# Patient Record
Sex: FEMALE | Race: BLACK OR AFRICAN AMERICAN | NOT HISPANIC OR LATINO | Employment: FULL TIME | URBAN - METROPOLITAN AREA
[De-identification: names, ages, dates, MRNs, and addresses within clinical notes are randomized per-mention and may not be internally consistent; named-entity substitution may affect disease eponyms.]

---

## 2017-04-12 ENCOUNTER — ALLSCRIPTS OFFICE VISIT (OUTPATIENT)
Dept: OTHER | Facility: OTHER | Age: 42
End: 2017-04-12

## 2017-04-17 RX ORDER — SODIUM CHLORIDE 9 MG/ML
20 INJECTION, SOLUTION INTRAVENOUS ONCE
Status: COMPLETED | OUTPATIENT
Start: 2017-04-18 | End: 2017-04-18

## 2017-04-18 ENCOUNTER — HOSPITAL ENCOUNTER (OUTPATIENT)
Dept: INFUSION CENTER | Facility: HOSPITAL | Age: 42
Discharge: HOME | End: 2017-04-18
Payer: COMMERCIAL

## 2017-04-18 VITALS
HEART RATE: 74 BPM | RESPIRATION RATE: 18 BRPM | SYSTOLIC BLOOD PRESSURE: 103 MMHG | DIASTOLIC BLOOD PRESSURE: 54 MMHG | TEMPERATURE: 97.9 F

## 2017-04-18 PROCEDURE — 96365 THER/PROPH/DIAG IV INF INIT: CPT

## 2017-04-18 RX ADMIN — IRON SUCROSE 200 MG: 20 INJECTION, SOLUTION INTRAVENOUS at 14:01

## 2017-04-18 RX ADMIN — SODIUM CHLORIDE 20 ML/HR: 0.9 INJECTION, SOLUTION INTRAVENOUS at 13:50

## 2017-04-18 NOTE — PLAN OF CARE
Problem: Potential for Falls  Goal: Patient will remain free of falls  INTERVENTIONS:  - Assess patient frequently for physical needs  - Identify cognitive and physical deficits and behaviors that affect risk of falls    - [place] fall precautions as indicated by assessment   - Educate patient/family on patient safety including physical limitations  - Instruct patient to call for assistance with activity based on assessment  - Modify environment to reduce risk of injury  - Consider OT/PT consult to assist with strengthening/mobility   Outcome: Progressing

## 2017-04-18 NOTE — PLAN OF CARE
Problem: Potential for Falls  Goal: Patient will remain free of falls  INTERVENTIONS:  - Assess patient frequently for physical needs  - Identify cognitive and physical deficits and behaviors that affect risk of falls    - [place] fall precautions as indicated by assessment   - Educate patient/family on patient safety including physical limitations  - Instruct patient to call for assistance with activity based on assessment  - Modify environment to reduce risk of injury  - Consider OT/PT consult to assist with strengthening/mobility   Outcome: Progressing

## 2017-04-24 RX ORDER — SODIUM CHLORIDE 9 MG/ML
20 INJECTION, SOLUTION INTRAVENOUS ONCE
Status: COMPLETED | OUTPATIENT
Start: 2017-04-25 | End: 2017-04-25

## 2017-04-25 ENCOUNTER — HOSPITAL ENCOUNTER (OUTPATIENT)
Dept: INFUSION CENTER | Facility: HOSPITAL | Age: 42
Discharge: HOME | End: 2017-04-25
Payer: COMMERCIAL

## 2017-04-25 VITALS
DIASTOLIC BLOOD PRESSURE: 64 MMHG | RESPIRATION RATE: 18 BRPM | TEMPERATURE: 97.5 F | SYSTOLIC BLOOD PRESSURE: 116 MMHG | HEART RATE: 68 BPM

## 2017-04-25 PROCEDURE — 96365 THER/PROPH/DIAG IV INF INIT: CPT

## 2017-04-25 RX ADMIN — IRON SUCROSE 200 MG: 20 INJECTION, SOLUTION INTRAVENOUS at 09:28

## 2017-04-25 RX ADMIN — SODIUM CHLORIDE 20 ML/HR: 0.9 INJECTION, SOLUTION INTRAVENOUS at 09:27

## 2017-04-25 NOTE — PLAN OF CARE
Problem: Potential for Falls  Goal: Patient will remain free of falls  INTERVENTIONS:  - Assess patient frequently for physical needs  - Identify cognitive and physical deficits and behaviors that affect risk of falls    - [place] fall precautions as indicated by assessment   - Educate patient/family on patient safety including physical limitations  - Instruct patient to call for assistance with activity based on assessment  - Modify environment to reduce risk of injury  - Consider OT/PT consult to assist with strengthening/mobility   Outcome: Progressing

## 2017-05-01 RX ORDER — SODIUM CHLORIDE 9 MG/ML
20 INJECTION, SOLUTION INTRAVENOUS ONCE
Status: COMPLETED | OUTPATIENT
Start: 2017-05-02 | End: 2017-05-02

## 2017-05-02 ENCOUNTER — HOSPITAL ENCOUNTER (OUTPATIENT)
Dept: INFUSION CENTER | Facility: HOSPITAL | Age: 42
Discharge: HOME | End: 2017-05-02
Payer: COMMERCIAL

## 2017-05-02 VITALS
HEART RATE: 68 BPM | SYSTOLIC BLOOD PRESSURE: 126 MMHG | RESPIRATION RATE: 18 BRPM | DIASTOLIC BLOOD PRESSURE: 59 MMHG | TEMPERATURE: 97 F

## 2017-05-02 PROCEDURE — 96365 THER/PROPH/DIAG IV INF INIT: CPT

## 2017-05-02 RX ADMIN — SODIUM CHLORIDE 20 ML/HR: 0.9 INJECTION, SOLUTION INTRAVENOUS at 08:55

## 2017-05-02 RX ADMIN — IRON SUCROSE 200 MG: 20 INJECTION, SOLUTION INTRAVENOUS at 08:55

## 2017-05-02 NOTE — PLAN OF CARE
Problem: Potential for Falls  Goal: Patient will remain free of falls  INTERVENTIONS:  - Assess patient frequently for physical needs  - Identify cognitive and physical deficits and behaviors that affect risk of falls    - [place] fall precautions as indicated by assessment   - Educate patient/family on patient safety including physical limitations  - Instruct patient to call for assistance with activity based on assessment  - Modify environment to reduce risk of injury  - Consider OT/PT consult to assist with strengthening/mobility   Outcome: Progressing

## 2017-09-08 ENCOUNTER — ALLSCRIPTS OFFICE VISIT (OUTPATIENT)
Dept: OTHER | Facility: OTHER | Age: 42
End: 2017-09-08

## 2017-09-08 DIAGNOSIS — Z12.31 ENCOUNTER FOR SCREENING MAMMOGRAM FOR MALIGNANT NEOPLASM OF BREAST: ICD-10-CM

## 2017-09-08 DIAGNOSIS — B20 HUMAN IMMUNODEFICIENCY VIRUS (HIV) DISEASE (HCC): ICD-10-CM

## 2017-09-18 ENCOUNTER — ALLSCRIPTS OFFICE VISIT (OUTPATIENT)
Dept: OTHER | Facility: OTHER | Age: 42
End: 2017-09-18

## 2018-01-12 NOTE — RESULT NOTES
Verified Results  MAMMO SCREENING BILATERAL W 3D & CAD [id number] 12:45PM Lazaro Chicas Order Number: [id number]    - Patient Instructions: To schedule this appointment, please contact Central Scheduling at [phone]  Do not wear any perfume, powder, lotion or deodorant on breast or underarm area  Please bring your doctors order, referral (if needed) and insurance information with you on the day of the test  Failure to bring this information may result in this test being rescheduled  Arrive 15 minutes prior to your appointment time to register  On the day of your test, please bring any prior mammogram or breast studies with you that were not performed at a [facility]  Failure to bring prior exams may result in your test needing to be rescheduled   Order Number: [id number]    - Patient Instructions: To schedule this appointment, please contact Central Scheduling at [phone]  Do not wear any perfume, powder, lotion or deodorant on breast or underarm area  Please bring your doctors order, referral (if needed) and insurance information with you on the day of the test  Failure to bring this information may result in this test being rescheduled  Arrive 15 minutes prior to your appointment time to register  On the day of your test, please bring any prior mammogram or breast studies with you that were not performed at a [facility]  Failure to bring prior exams may result in your test needing to be rescheduled  Test Name Result Flag Reference   MAMMO SCREENING BILATERAL W 3D & CAD (Report)     Patient History:   No known family history of cancer  Took hormonal contraceptives for 1 month  Patient has never smoked  Patient's BMI is 24 9  Reason for exam: screening (asymptomatic)  Mammo Screening Bilateral W DBT and CAD: September 30, 2016 -    Check In #: [de-identified]   2D/3D Procedure   3D views: Bilateral MLO view(s) were taken     2D views: Bilateral CC and XCCL view(s) were taken  Technologist: Stella Thompson, RT(R)(M)   Prior study comparison: May 13, 2015, digital bilateral screening   mammogram, performed at [facility]  March 21, 2014, digital bilateral screening mammogram, performed at [address] [facility]  December 22, 2010, bilateral WB digitl    bilat filomena, performed at [address]  The breast tissue is heterogeneously dense, potentially limiting    the sensitivity of mammography  Patient risk, included in this    report, assists in determining the appropriate screening regimen    (such as 3-D mammography or the inclusion of automated breast    ultrasound or MRI)  3-D mammography may also remain indicated as    screening  A combination of mediolateral oblique 3-D tomographic   slices as well as standard two-dimensional orthogonal images    were obtained  No dominant soft tissue mass, architectural distortion or    suspicious calcifications are noted in either breast   The skin    and nipple structures are within normal limits  Scattered benign   appearing calcifications are noted  No evidence of malignancy  No significant changes when compared with prior studies  ASSESSMENT: BiRad:2 - Benign     Recommendation:   Routine screening mammogram of both breasts in 1 year  A    reminder letter will be scheduled  8-10% of cancers will be missed on mammography  Management of a    palpable abnormality must be based on clinical grounds  Patients    will be notified of their results via letter from our facility  Accredited by [employer] of [employer] and FDA       [facility] [id number]     Risk Value(s):   Tyrer-Cuzick 10 Year: 1 539%, Tyrer-Cuzick Lifetime: 10 449%,    Myriad Table: 1 5%, MARSHA 5 Year: 0 7%, NCI Lifetime: 9 5%   Signed by:   Laura Morgan MD   9/30/16

## 2018-01-12 NOTE — MISCELLANEOUS
Message  PT NO SHOW FOR APPT 2/16/2016 DUE TO INCLEMENT WEATHER  LEFT MESSAGE TO RESCHEDULE      Active Problems    1  Iron deficiency anemia (280 9) (D50 9)   2  Menorrhagia (626 2) (N92 0)    Current Meds   1  Iron TABS; Therapy: (Recorded:23Jan2015) to Recorded    Allergies    1  No Known Drug Allergies    Signatures   Electronically signed by :  Diana Duobis, ; Feb 16 2016  9:59AM EST                       (Author)

## 2018-01-13 VITALS
SYSTOLIC BLOOD PRESSURE: 108 MMHG | HEIGHT: 64 IN | WEIGHT: 148.6 LBS | BODY MASS INDEX: 25.37 KG/M2 | DIASTOLIC BLOOD PRESSURE: 60 MMHG

## 2018-01-13 NOTE — PROGRESS NOTES
Chief Complaint  Chief Complaint Free Text Note Form: Follow-up for HIV disease  History of Present Illness  HPI: Patient is here for follow-up  Her last visit here was in September 2015  She also has not had any blood work since that time  She is doing well  No specific complaints  She is tolerating Atripla well  No nausea, vomiting or diarrhea  Review of Systems  Complete-Female:   Constitutional: No fever, no chills, feels well, no tiredness, no recent weight gain or weight loss  Eyes: No complaints of eye pain, no red eyes, no eyesight problems, no discharge, no dry eyes, no itching of eyes  ENT: no complaints of earache, no loss of hearing, no nose bleeds, no nasal discharge, no sore throat, no hoarseness  Cardiovascular: No complaints of slow heart rate, no fast heart rate, no chest pain, no palpitations, no leg claudication, no lower extremity edema  Respiratory: No complaints of shortness of breath, no wheezing, no cough, no SOB on exertion, no orthopnea, no PND  Gastrointestinal: No complaints of abdominal pain, no constipation, no nausea or vomiting, no diarrhea, no bloody stools  Genitourinary: No complaints of dysuria, no incontinence, no pelvic pain, no dysmenorrhea, no vaginal discharge or bleeding  Musculoskeletal: No complaints of arthralgias, no myalgias, no joint swelling or stiffness, no limb pain or swelling  Integumentary: No complaints of skin rash or lesions, no itching, no skin wounds, no breast pain or lump  Neurological: No complaints of headache, no confusion, no convulsions, no numbness, no dizziness or fainting, no tingling, no limb weakness, no difficulty walking  Endocrine: No complaints of proptosis, no hot flashes, no muscle weakness, no deepening of the voice, no feelings of weakness  Hematologic/Lymphatic: No complaints of swollen glands, no swollen glands in the neck, does not bleed easily, does not bruise easily  Active Problems    1  Dysmenorrhea (625 3) (N94 6)   2  Dyspareunia, female (625 0) (N94 10)   3  Dysuria (788 1) (R30 0)   4  Encounter for screening mammogram for breast cancer (V76 12) (Z12 31)   5  Fibroid, uterine (218 9) (D25 9)   6  Gynecologic exam normal (V72 31) (Z01 419)   7  History of cervical dysplasia (V13 22) (Z87 410)   8  Human immunodeficiency virus (HIV) infection (V08) (B20)   5  Iron deficiency anemia (280 9) (D50 9)   10  Lower abdominal pain (789 09) (R10 30)   11  Menorrhagia (626 2) (N92 0)   12  Postoperative visit (V58 49) (Z48 89)   13  Urinary frequency (788 41) (R35 0)   14  Uterus, adenomyosis (617 0) (N80 0)    Past Medical History    1  History of HGSIL (high grade squamous intraepithelial lesion) on Pap smear of cervix   (795 04) (D67 515)   2  History of abnormal cervical Pap smear (V13 29) (S89 424)   3  History of anemia (V12 3) (Z86 2)   4  History of carcinoma in situ of cervix (V13 89) (Z86 008)   5  History of uterine leiomyoma (V13 29) (Z86 018)   6  Human immunodeficiency virus (HIV) infection (V08) (B20)   7  Menorrhagia (626 2) (N92 0)   8  History of JAKUB III (vulvar intraepithelial neoplasia III) (233 32) (D07 1)    Surgical History    1  History of Cervical Loop Electrosurgical Excision (LEEP)   2  History of Colposcopy   3  History of Dilation And Curettage   4  History of Laser Ablation Of Cervix   5  History of Surgically Induced    6  History of Vulvar Surgery    Family History    1  No pertinent family history    2  No pertinent family history    3  Family history of Stroke    Social History    · Denied: History of Drug use   · Never smoker   · No alcohol use   · One child    Current Meds   1  Atripla 600-200-300 MG Oral Tablet; Take 1 tablet daily; Therapy: 29TYS2528 to (Evaluate:32Eor4648); Last Rx:72Qra6362 Ordered   2  Iron TABS; Takes unknown dose occassionally; Therapy: (Recorded:89Yxh3287) to Recorded   3  Multivitamin TABS; TAKE 1 TABLET DAILY;    Therapy: (Recorded:18Sep2017) to Recorded    Allergies    1  No Known Drug Allergies    Vitals  Signs   Recorded: 18Sep2017 02:31PM   Temperature: 97 6 F  Heart Rate: 60  Respiration: 14  Systolic: 779  Diastolic: 64  Height: 5 ft 4 in  Weight: 148 lb 6 4 oz  BMI Calculated: 25 47  BSA Calculated: 1 72  O2 Saturation: 97    Physical Exam    Constitutional   General appearance: No acute distress, well appearing and well nourished  well developed, well nourished, overweight, well hydrated and appearance reflects stated age  Pulmonary   Respiratory effort: No increased work of breathing or signs of respiratory distress  Respiratory rate: normal  Assessment of respiratory effort revealed normal rhythm and effort  Auscultation of lungs: Clear to auscultation  no rales or crackles were heard bilaterally  no rhonchi  no friction rub  no wheezing  Cardiovascular   Auscultation of heart: Normal rate and rhythm, normal S1 and S2, without murmurs  The heart rate was normal  The rhythm was regular  Heart sounds: normal S1 and normal S2  no murmurs were heard  Abdomen   Abdomen: Non-tender, no masses  The abdomen was flat  Bowel sounds were normal  The abdomen was soft and nontender  Liver and spleen: No hepatomegaly or splenomegaly  No hepatosplenomegaly  Assessment    1  Human immunodeficiency virus (HIV) infection (V08) (B20)    Plan    1  Atripla 600-200-300 MG Oral Tablet; Take one tablet daily   2  Follow-up visit in 6 months Evaluation and Treatment  Follow-up  Status: Hold For -   Scheduling  Requested for: 62ZST7017   3  (1) CBC/PLT/DIFF; Status:Active; Requested for:05Mar2018;    4  (1) CBC/PLT/DIFF; Status:Active; Requested for:18Sep2017;    5  (1) CD4-CD8 RATIO PROFILE; Status:Active; Requested for:05Mar2018;    6  (1) CD4-CD8 RATIO PROFILE; Status:Active; Requested for:18Sep2017;    7  (1) COMPREHENSIVE METABOLIC PANEL; Status:Active;  Requested for:05Mar2018;    8  (1) COMPREHENSIVE METABOLIC PANEL; Status:Active; Requested for:61Cwg9006;    9  (1) HIV-1 RNA QUANTITATIVE; [Do Not Release]; Status:Active; Requested   for:05Mar2018;    10  (1) HIV-1 RNA QUANTITATIVE; [Do Not Release]; Status:Active; Requested    for:64Hcp5223;    11  (1) LACTIC ACID; Status:Active; Requested for:05Mar2018;    12  (1) LACTIC ACID; Status:Active; Requested for:55Smy2726;    13  (1) LIPID PANEL, FASTING; Status:Active; Requested for:05Mar2018;    14  (1) LIPID PANEL, FASTING; Status:Active; Requested for:97Ead9977;     Discussion/Summary  Discussion Summary:   Patient's HIV disease had been well controlled in the past  Patient has not been compliant with follow-up  However, she states that she has been taking Atripla every day for the last 2 years  She is clinically well  Patient will need to have her CD4/VL checked ASAP  I also stressed to her the importance of having follow-up and blood work checked every 6 months  From this point forward, I will just give her 6 month supply of Atripla  She will need to do her 6 month blood work and follow-up In order to get Atripla refill  I also discussed with her the availability of many new antiretroviral regimens today  As long as her response remains good on Atripla, and she wants to continue it, we can continue Atripla  On the other hand, we do have the option of going with a newer antiretroviral regimen  Refill Atripla for 6 months  Check CD4/DL and other labs ASAP  Rx for repeat labs in 6 month  Follow-up with me in 6 months  Signatures   Electronically signed by :  KINGS Ho ; Sep 18 2017  2:56PM EST                       (Author)

## 2018-01-14 VITALS
OXYGEN SATURATION: 97 % | TEMPERATURE: 97.6 F | SYSTOLIC BLOOD PRESSURE: 100 MMHG | HEIGHT: 64 IN | RESPIRATION RATE: 14 BRPM | DIASTOLIC BLOOD PRESSURE: 64 MMHG | HEART RATE: 60 BPM | BODY MASS INDEX: 25.33 KG/M2 | WEIGHT: 148.4 LBS

## 2018-01-17 NOTE — RESULT NOTES
Verified Results  (1) TISSUE EXAM [id number] 12:20PM Smitha Roth     Test Name Result Flag Reference   LAB AP CASE REPORT (Report)     Surgical Pathology Report             Case: [id number]                   Authorizing Provider: Kalyn Haskins MD     Collected:      05/02/2016 1220        Ordering Location:   [address] [place] [address]   Received:      05/02/2016 [address] [place] [address] Rd Operating Room                            Pathologist:      Kaila Brantley MD                              Specimen:  Uterus, uterus, cervix, portion of rt  ovary; and rt  and lt fallopian tubes   LAB AP FINAL DIAGNOSIS (Report)     A  Uterus, cervix, portion of right ovary; and right and left fallopian   tubes:    - Cervix: No significant pathologic abnormalities  - Uterus:      -- Unremarkable endometrial basalis  -- Intramural, subserosal and submucosal leiomyomata  -- Adenomyosis  - Portion of right ovary: Cortical fibrosis with focal surface stromal   papillarity     - Fallopian tubes: Simple paratubal cyst (left) and no further   significant pathologic abnormalities  LAB AP SURGICAL ADDITIONAL INFORMATION (Report)     These tests were developed and their performance characteristics   determined by [facility] or [facility]  They may not be cleared or approved by the [place]  Food and   Drug Administration  The FDA has determined that such clearance or   approval is not necessary  These tests are used for clinical purposes  They should not be regarded as investigational or for research  This   laboratory has been approved by CLIA 88, designated as a high-complexity   laboratory and is qualified to perform these tests  - Interpretation performed at [facility]   LAB AP GROSS DESCRIPTION (Report)     A   The specimen is received in formalin, labeled with the patient's name   and hospital number, and is designated uterus, cervix, portion of right   ovary; and right and left tubes  The specimen consists of an irregular   uterus with attached cervix, attached right and left fallopian tubes   weighing 555 g  A separate irregular pale white brown tissue fragment   measures 1 0 x 0 8 x 0 6 cm in greatest dimension and weighs less than 1   g  The uterus and cervix measure 14 0 x 10 0 x 7 8 cm in greatest   dimensions  The external is smooth, shaggy dull tan-brown  Partial   anterior surface is inked blue  The ectocervix is rubbery and pale white  The endocervical canal appears focally corrugated tan-brown  The   endometrial cavity is flattened pale tan brown and measures up to 0 1 cm   in thickness  The myometrium exhibits greater than 8 subserosal,   intramural and submucosal rubbery white circumscribed nodules measuring up   to 7 0 cm in greatest dimension  The right fallopian tube with fimbria   measures 8 2 cm in length by 0 4 cm in average diameter and exhibits   paratubular cyst measuring up to 0 7 cm in greatest dimension  The left   fallopian tube with fimbria measures 6 0 cm in length and 0 6 cm in   average diameter and exhibits paratubular cyst measuring up to 1 2 cm in   greatest dimension  Representative sections are submitted as follows:  1: Right fallopian tube  2: Left fallopian tube  3: Anterior cervix  4: Posterior cervix  5-6: Anterior endomyometrium in each cassette, each split   7-8: Posterior endomyometrium  9-10: Smaller to intermediate circumscribed nodules  11-12: Largest circumscribed nodule  13: Entire separate irregular pale white brown tissue, bisected  Note: The estimated total formalin fixation time based upon information   provided by the submitting clinician and the standard processing schedule   is 56 0 hours      Jamal Araujo

## 2018-03-05 DIAGNOSIS — B20 HUMAN IMMUNODEFICIENCY VIRUS (HIV) DISEASE (HCC): ICD-10-CM

## 2018-04-24 ENCOUNTER — TELEPHONE (OUTPATIENT)
Dept: INFECTIOUS DISEASES | Facility: CLINIC | Age: 43
End: 2018-04-24

## 2018-04-24 NOTE — TELEPHONE ENCOUNTER
Called Valorie to remind her of her labs that should be done before her appointment  However, her voicemail is not set up  Will try again later

## 2018-04-25 ENCOUNTER — TELEPHONE (OUTPATIENT)
Dept: INFECTIOUS DISEASES | Facility: CLINIC | Age: 43
End: 2018-04-25

## 2018-06-07 ENCOUNTER — OFFICE VISIT (OUTPATIENT)
Dept: INFECTIOUS DISEASES | Facility: CLINIC | Age: 43
End: 2018-06-07
Payer: COMMERCIAL

## 2018-06-07 VITALS
HEIGHT: 64 IN | BODY MASS INDEX: 25.71 KG/M2 | HEART RATE: 68 BPM | SYSTOLIC BLOOD PRESSURE: 102 MMHG | TEMPERATURE: 97.4 F | RESPIRATION RATE: 20 BRPM | DIASTOLIC BLOOD PRESSURE: 62 MMHG | WEIGHT: 150.6 LBS

## 2018-06-07 DIAGNOSIS — Z91.19 NONCOMPLIANCE: ICD-10-CM

## 2018-06-07 DIAGNOSIS — B20 HIV (HUMAN IMMUNODEFICIENCY VIRUS INFECTION) (HCC): Primary | ICD-10-CM

## 2018-06-07 PROCEDURE — 99214 OFFICE O/P EST MOD 30 MIN: CPT | Performed by: INTERNAL MEDICINE

## 2018-06-07 RX ORDER — METHOCARBAMOL 500 MG/1
TABLET, FILM COATED ORAL
Refills: 0 | COMMUNITY
Start: 2018-03-03 | End: 2020-06-15

## 2018-06-07 RX ORDER — CLINDAMYCIN PHOSPHATE 10 MG/ML
SOLUTION TOPICAL
Refills: 5 | COMMUNITY
Start: 2018-04-30 | End: 2020-06-15

## 2018-06-07 RX ORDER — KETOCONAZOLE 20 MG/ML
SHAMPOO TOPICAL
Refills: 5 | COMMUNITY
Start: 2018-04-30 | End: 2020-06-15

## 2018-06-07 NOTE — PROGRESS NOTES
Progress Note - Infectious Disease   Valorie Coto 39 y o  female MRN: [id number]  Unit/Bed#:  Encounter: [id number]      Impression/Recommendations:  1  HIV disease  This has been well controlled with Atripla  CD4 remains above 500 (564)  However, patient's VL is now mildly elevated (43) after has been undetectable for a longtime  This may be nothing to be alarmed about  However, given patient's possible noncompliance, I am concerned about possible resistance development  I will have repeat CD4/VL in 1 month  Continue Atripla for now  Repeat CD4/dL in 1 month  If VL is undetectable, follow-up in 6 months  If VL is more elevated, follow-up in 1 month  2   Noncompliance with medication and follow-up  I stressed with her again in great detail the importance of compliance with medication, blood work and follow-up  I emphasized to her that slightly elevated VL this time may be sign of resistance development from noncompliance with medication  Discussed with patient in great detail regarding the above plan  Antibiotics:  Atripla    Subjective:  Patient is here for follow-up  She feels well  No specific complaints  She states she missed a few doses, but not too many    She is tolerating Atripla well  No nausea, vomiting or diarrhea  Objective:  Vitals:  Temperature: (!) 97 4 °F (36 3 °C)    Physical Exam:     General: Awake, alert, cooperative, no distress  Lungs: Expansion symmetric, no rales, no wheezing, respirations unlabored  Heart[de-identified]  Regular rate and rhythm, S1 and S2 normal, no murmur  Abdomen: Soft, nondistended, non-tender, bowel sounds active all four quadrants,        no masses, no organomegaly  Extremities: No edema  No erythema/warmth  No ulcer  Nontender to palpation  Skin:  No rash  Invasive Devices          No matching active lines, drains, or airways          Labs studies:   I have personally reviewed pertinent labs      Imaging Studies:   I have personally reviewed pertinent imaging study reports and images in PACS  EKG, Pathology, and Other Studies:   I have personally reviewed pertinent reports

## 2018-08-27 DIAGNOSIS — B20 HIV (HUMAN IMMUNODEFICIENCY VIRUS INFECTION) (HCC): Primary | ICD-10-CM

## 2018-08-27 RX ORDER — EFAVIRENZ, EMTRICITABINE AND TENOFOVIR DISOPROXIL FUMARATE 600; 200; 300 MG/1; MG/1; MG/1
1 TABLET, FILM COATED ORAL
Qty: 30 TABLET | Refills: 3 | Status: SHIPPED | OUTPATIENT
Start: 2018-08-27 | End: 2019-01-14 | Stop reason: SDUPTHER

## 2018-12-20 ENCOUNTER — OFFICE VISIT (OUTPATIENT)
Dept: INFECTIOUS DISEASES | Facility: CLINIC | Age: 43
End: 2018-12-20
Payer: COMMERCIAL

## 2018-12-20 VITALS
RESPIRATION RATE: 16 BRPM | HEART RATE: 53 BPM | BODY MASS INDEX: 23.97 KG/M2 | DIASTOLIC BLOOD PRESSURE: 62 MMHG | WEIGHT: 140.4 LBS | SYSTOLIC BLOOD PRESSURE: 98 MMHG | TEMPERATURE: 96.8 F | HEIGHT: 64 IN

## 2018-12-20 DIAGNOSIS — Z91.19 NONCOMPLIANCE: ICD-10-CM

## 2018-12-20 DIAGNOSIS — B20 HIV (HUMAN IMMUNODEFICIENCY VIRUS INFECTION) (HCC): Primary | ICD-10-CM

## 2018-12-20 PROCEDURE — 99214 OFFICE O/P EST MOD 30 MIN: CPT | Performed by: INTERNAL MEDICINE

## 2018-12-20 RX ORDER — EFAVIRENZ, EMTRICITABINE, AND TENOFOVIR DISOPROXIL FUMARATE 600; 200; 300 MG/1; MG/1; MG/1
1 TABLET, FILM COATED ORAL DAILY
Refills: 3 | COMMUNITY
Start: 2018-11-25 | End: 2018-12-20 | Stop reason: SDUPTHER

## 2019-01-14 DIAGNOSIS — B20 HIV (HUMAN IMMUNODEFICIENCY VIRUS INFECTION) (HCC): ICD-10-CM

## 2019-01-14 RX ORDER — EFAVIRENZ, EMTRICITABINE, AND TENOFOVIR DISOPROXIL FUMARATE 600; 200; 300 MG/1; MG/1; MG/1
TABLET, FILM COATED ORAL
Qty: 30 TABLET | Refills: 0 | Status: SHIPPED | OUTPATIENT
Start: 2019-01-14 | End: 2019-03-22 | Stop reason: SDUPTHER

## 2019-03-22 DIAGNOSIS — B20 HIV (HUMAN IMMUNODEFICIENCY VIRUS INFECTION) (HCC): ICD-10-CM

## 2019-03-22 RX ORDER — EFAVIRENZ, EMTRICITABINE AND TENOFOVIR DISOPROXIL FUMARATE 600; 200; 300 MG/1; MG/1; MG/1
1 TABLET, FILM COATED ORAL
Qty: 30 TABLET | Refills: 3 | Status: SHIPPED | OUTPATIENT
Start: 2019-03-22 | End: 2019-11-27

## 2019-03-31 PROBLEM — Z21 HISTORY OF HIV INFECTION (HCC): Status: ACTIVE | Noted: 2019-03-31

## 2019-03-31 PROBLEM — Z01.419 ENCOUNTER FOR ANNUAL ROUTINE GYNECOLOGICAL EXAMINATION: Status: ACTIVE | Noted: 2019-03-31

## 2019-03-31 PROBLEM — B20 HISTORY OF HIV INFECTION (HCC): Status: ACTIVE | Noted: 2019-03-31

## 2019-03-31 PROBLEM — Z12.31 ENCOUNTER FOR SCREENING MAMMOGRAM FOR BREAST CANCER: Status: ACTIVE | Noted: 2019-03-31

## 2019-04-01 ENCOUNTER — ANNUAL EXAM (OUTPATIENT)
Dept: GYNECOLOGY | Facility: CLINIC | Age: 44
End: 2019-04-01
Payer: COMMERCIAL

## 2019-04-01 VITALS
HEIGHT: 64 IN | DIASTOLIC BLOOD PRESSURE: 70 MMHG | HEART RATE: 57 BPM | SYSTOLIC BLOOD PRESSURE: 100 MMHG | BODY MASS INDEX: 23.32 KG/M2 | WEIGHT: 136.6 LBS

## 2019-04-01 DIAGNOSIS — Z12.31 ENCOUNTER FOR SCREENING MAMMOGRAM FOR BREAST CANCER: ICD-10-CM

## 2019-04-01 DIAGNOSIS — Z01.419 ENCOUNTER FOR ANNUAL ROUTINE GYNECOLOGICAL EXAMINATION: Primary | ICD-10-CM

## 2019-04-01 DIAGNOSIS — B20 HISTORY OF HIV INFECTION (HCC): ICD-10-CM

## 2019-04-01 PROCEDURE — 99386 PREV VISIT NEW AGE 40-64: CPT | Performed by: OBSTETRICS & GYNECOLOGY

## 2019-06-05 ENCOUNTER — TELEPHONE (OUTPATIENT)
Dept: INFECTIOUS DISEASES | Facility: CLINIC | Age: 44
End: 2019-06-05

## 2019-06-20 ENCOUNTER — OFFICE VISIT (OUTPATIENT)
Dept: INFECTIOUS DISEASES | Facility: CLINIC | Age: 44
End: 2019-06-20
Payer: COMMERCIAL

## 2019-06-20 VITALS
BODY MASS INDEX: 23.12 KG/M2 | DIASTOLIC BLOOD PRESSURE: 52 MMHG | WEIGHT: 135.4 LBS | TEMPERATURE: 97.4 F | HEART RATE: 77 BPM | HEIGHT: 64 IN | SYSTOLIC BLOOD PRESSURE: 96 MMHG | RESPIRATION RATE: 15 BRPM

## 2019-06-20 DIAGNOSIS — Z91.19 NONCOMPLIANCE: ICD-10-CM

## 2019-06-20 DIAGNOSIS — B20 HIV INFECTION, UNSPECIFIED SYMPTOM STATUS (HCC): Primary | ICD-10-CM

## 2019-06-20 PROCEDURE — 99214 OFFICE O/P EST MOD 30 MIN: CPT | Performed by: INTERNAL MEDICINE

## 2019-06-20 RX ORDER — EFAVIRENZ, EMTRICITABINE AND TENOFOVIR DISOPROXIL FUMARATE 600; 200; 300 MG/1; MG/1; MG/1
1 TABLET, FILM COATED ORAL
Status: CANCELLED | OUTPATIENT
Start: 2019-06-20

## 2019-08-09 ENCOUNTER — TRANSCRIBE ORDERS (OUTPATIENT)
Dept: ADMINISTRATIVE | Facility: HOSPITAL | Age: 44
End: 2019-08-09

## 2019-08-09 DIAGNOSIS — E04.1 NONTOXIC UNINODULAR GOITER: Primary | ICD-10-CM

## 2019-09-23 ENCOUNTER — HOSPITAL ENCOUNTER (OUTPATIENT)
Dept: RADIOLOGY | Age: 44
Discharge: HOME | End: 2019-09-23
Payer: COMMERCIAL

## 2019-09-23 DIAGNOSIS — E04.1 NONTOXIC UNINODULAR GOITER: ICD-10-CM

## 2019-09-23 PROCEDURE — 76536 US EXAM OF HEAD AND NECK: CPT

## 2019-10-08 ENCOUNTER — HOSPITAL ENCOUNTER (OUTPATIENT)
Dept: RADIOLOGY | Age: 44
Discharge: HOME | End: 2019-10-08
Payer: COMMERCIAL

## 2019-10-08 VITALS — BODY MASS INDEX: 23.05 KG/M2 | WEIGHT: 135 LBS | HEIGHT: 64 IN

## 2019-10-08 DIAGNOSIS — Z12.31 ENCOUNTER FOR SCREENING MAMMOGRAM FOR BREAST CANCER: ICD-10-CM

## 2019-10-08 PROCEDURE — 77067 SCR MAMMO BI INCL CAD: CPT

## 2019-10-08 PROCEDURE — 77063 BREAST TOMOSYNTHESIS BI: CPT

## 2019-11-21 ENCOUNTER — TELEPHONE (OUTPATIENT)
Dept: INFECTIOUS DISEASES | Facility: CLINIC | Age: 44
End: 2019-11-21

## 2019-11-27 ENCOUNTER — OFFICE VISIT (OUTPATIENT)
Dept: INFECTIOUS DISEASES | Facility: CLINIC | Age: 44
End: 2019-11-27
Payer: COMMERCIAL

## 2019-11-27 VITALS
BODY MASS INDEX: 23.9 KG/M2 | WEIGHT: 140 LBS | HEART RATE: 75 BPM | HEIGHT: 64 IN | TEMPERATURE: 97.7 F | DIASTOLIC BLOOD PRESSURE: 68 MMHG | SYSTOLIC BLOOD PRESSURE: 94 MMHG

## 2019-11-27 DIAGNOSIS — Z91.19 NONCOMPLIANCE: ICD-10-CM

## 2019-11-27 DIAGNOSIS — B20 HIV INFECTION, UNSPECIFIED SYMPTOM STATUS (HCC): Primary | ICD-10-CM

## 2019-11-27 DIAGNOSIS — B20 HIV INFECTION, UNSPECIFIED SYMPTOM STATUS (HCC): ICD-10-CM

## 2019-11-27 LAB — EXTERNAL HIV SCREEN: NORMAL

## 2019-11-27 PROCEDURE — 99214 OFFICE O/P EST MOD 30 MIN: CPT | Performed by: INTERNAL MEDICINE

## 2019-11-27 NOTE — PROGRESS NOTES
Progress Note - Infectious Disease   Valorie Amaya 38 y o  female MRN: [id number]  Unit/Bed#:  Encounter: [id number]      Impression/Recommendations:  1   HIV disease   This had been well controlled with Atripla   ART was changed to [facility] earlier this year due to side effect from Atripla  Patient is tolerating it well  Unfortunately, she has not done any blood work recently  Continue  Biktarvy  Patient to do blood work for [facility] today  Follow-up in 6 months with labs      2   Noncompliance with medication and follow-up previously    Patient is now complying with medications again  Mikayla Guo is showing compliance      Discussed with patient in detail regarding the above plan  Follow-up in 6 months with labs      Antibiotics:  Biktarvy     Subjective:  Patient is here for follow-up  She is feeling well  Drowsiness improved now that she is off Atripla  She is tolerating new ART well  Objective:  Vitals:  [unfilled]  PromiseUP[employer] com: Temperature: 97 7 °F (36 5 °C)    Physical Exam:     General: Awake, alert, cooperative, no distress  Neck:  Supple  No mass  No lymphadenopathy  Lungs: Expansion symmetric, no rales, no wheezing, respirations unlabored  Heart:  Regular rate and rhythm, S1 and S2 normal, no murmur  Abdomen: Soft, nondistended, non-tender, bowel sounds active all four quadrants,        no masses, no organomegaly  Extremities: No edema  No erythema/warmth  No ulcer  Nontender to palpation  Skin:  No rash  Neuro: Moves all extremities  Invasive Devices     None                 Labs studies:   I have personally reviewed pertinent labs  Invalid input(s): ALBUMIN            Imaging Studies:   I have personally reviewed pertinent imaging study reports and images in PACS  EKG, Pathology, and Other Studies:   I have personally reviewed pertinent reports

## 2020-05-22 ENCOUNTER — TELEPHONE (OUTPATIENT)
Dept: INFECTIOUS DISEASES | Facility: CLINIC | Age: 45
End: 2020-05-22

## 2020-06-15 ENCOUNTER — ANNUAL EXAM (OUTPATIENT)
Dept: GYNECOLOGY | Facility: CLINIC | Age: 45
End: 2020-06-15
Payer: COMMERCIAL

## 2020-06-15 VITALS
BODY MASS INDEX: 23.05 KG/M2 | DIASTOLIC BLOOD PRESSURE: 62 MMHG | HEIGHT: 64 IN | SYSTOLIC BLOOD PRESSURE: 106 MMHG | WEIGHT: 135 LBS

## 2020-06-15 DIAGNOSIS — Z12.31 ENCOUNTER FOR SCREENING MAMMOGRAM FOR BREAST CANCER: ICD-10-CM

## 2020-06-15 DIAGNOSIS — Z01.419 ENCOUNTER FOR ANNUAL ROUTINE GYNECOLOGICAL EXAMINATION: Primary | ICD-10-CM

## 2020-06-15 DIAGNOSIS — B20 HISTORY OF HIV INFECTION (HCC): ICD-10-CM

## 2020-06-15 PROCEDURE — S0612 ANNUAL GYNECOLOGICAL EXAMINA: HCPCS | Performed by: OBSTETRICS & GYNECOLOGY

## 2020-08-21 ENCOUNTER — CONSULT (OUTPATIENT)
Dept: VASCULAR SURGERY | Facility: CLINIC | Age: 45
End: 2020-08-21
Payer: COMMERCIAL

## 2020-08-21 VITALS
TEMPERATURE: 97.7 F | HEIGHT: 64 IN | DIASTOLIC BLOOD PRESSURE: 62 MMHG | BODY MASS INDEX: 23.56 KG/M2 | RESPIRATION RATE: 18 BRPM | SYSTOLIC BLOOD PRESSURE: 116 MMHG | WEIGHT: 138 LBS | HEART RATE: 52 BPM

## 2020-08-21 DIAGNOSIS — I83.893 SYMPTOMATIC VARICOSE VEINS, BILATERAL: Primary | ICD-10-CM

## 2020-08-21 PROCEDURE — 99203 OFFICE O/P NEW LOW 30 MIN: CPT | Performed by: NURSE PRACTITIONER

## 2020-08-21 NOTE — ASSESSMENT & PLAN NOTE
19-year-old female with HIV, hysterectomy, bilateral lower extremity symptomatic varicosities who presents for vascular evaluation  Bilateral posterior calf varicosities with intermittent tenderness and itching, right greater than left  -We discussed physiology of venous disease and available treatment options  -Recommended trial of conservative measures which includes the daily use of gradient compression hose, periodic leg elevation, regular exercise  -Rx 20-30 mmHg compression given  -Return to the office in 3 months to check symptoms with the use of compression

## 2020-08-21 NOTE — PATIENT INSTRUCTIONS
Varicose Veins   AMBULATORY CARE:   Varicose veins  are veins that become large, twisted, and swollen  They are common on the back of the calves, knees, and thighs  Varicose veins are caused by valves in your veins that do not work properly  This causes blood to collect and increase pressure in the veins of your legs  The increased pressure causes your veins to stretch, get larger, swell, and twist        Common symptoms include the following: Your symptoms may be worse after you stand or sit for long periods of time  You may have any of the following:  · Blue, purple, or bulging veins in your legs     · Pain, swelling, or muscle cramps in your legs    · Feeling of fatigue or heaviness in your legs    · Cramping in your legs  Seek care immediately if:   · You have a wound that does not heal or is infected  · You have an injury that has broken your skin and caused your varicose veins to bleed  · Your leg is swollen and hard  · You notice that your legs or feet are turning blue or black  · Your leg feels warm, tender, and painful  It may look swollen and red  Contact your healthcare provider if:   · You have pain in your leg that does not go away or gets worse  · You notice sudden large bruising on your legs  · You have a rash on your leg  · Your symptoms keep you from doing your daily activities  · You have questions or concerns about your condition or care  Treatment of varicose veins  aims to decrease symptoms, improve appearance, and prevent further problems  Treatment will depend on which veins are affected and how severe your condition is  You may need procedures to treat or remove your varicose veins  For example, your healthcare provider may inject a solution or use a laser to close the varicose veins  Surgery to remove long veins may also be done  Ask your healthcare provider for more information about procedures used to treat varicose veins    Manage varicose veins:   · Do not sit or stand for long periods of time  This can cause the blood to collect in your legs and make your symptoms worse  Bend or rotate your ankles several times every hour  Walk around for a few minutes every hour to get blood moving in your legs  · Do not cross your legs when you sit  This decreases blood flow to your feet and can make your symptoms worse  · Do not wear tight clothing or shoes  Do not wear high-heeled shoes  Do not wear clothes that are tight around the waist or knees  · Maintain a healthy weight  Being overweight or obese can make your varicose veins worse  Ask your healthcare provider how much you should weigh  Ask him or her to help you create a weight loss plan if you are overweight  · Wear pressure stockings as directed  The stockings are tight and put pressure on your legs  They improve blood flow and help prevent clots  · Elevate your legs  Keep them above the level of your heart for 15 to 30 minutes several times a day  You can also prop the end of your bed up slightly to elevate your legs while you sleep  This will help blood to flow back to your heart  · Get regular exercise  Talk to your healthcare provider about the best exercise plan for you  Exercise can improve blood flow to your legs and feet  Follow up with your healthcare provider as directed:  Write down your questions so you remember to ask them during your visits  © 2017 [employer] [employer] Information is for End User's use only and may not be sold, redistributed or otherwise used for commercial purposes  All illustrations and images included in CareNotes® are the copyrighted property of A D A M [employer]  or Zack Hernandez  The above information is an  only  It is not intended as medical advice for individual conditions or treatments  Talk to your doctor, nurse or pharmacist before following any medical regimen to see if it is safe and effective for you

## 2020-08-21 NOTE — PROGRESS NOTES
Assessment/Plan:          Problem List Items Addressed This Visit        Cardiovascular and Mediastinum    Symptomatic varicose veins, bilateral - Primary     69-year-old female with HIV, hysterectomy, bilateral lower extremity symptomatic varicosities who presents for vascular evaluation  Bilateral posterior calf varicosities with intermittent tenderness and itching, right greater than left  -We discussed physiology of venous disease and available treatment options  -Recommended trial of conservative measures which includes the daily use of gradient compression hose, periodic leg elevation, regular exercise  -Rx 20-30 mmHg compression given  -Return to the office in 3 months to check symptoms with the use of compression         Relevant Orders    Compression Stocking            Subjective:      Patient ID: Narendra Fleming is a 54 y o  female     Patient is new to our practice and was self referred for varicose veins  Pt c/o bulging painful veins in BLE  Pt first noticed the bulging veins a couple of years ago  Pt denies DVT or bleeding vein hx  Pt does not wear compression stockings or elevate her legs        Chief Complaint: check my veins      HPI  Varicose Veins    Narendra Fleming is seen for evaluation of: [x]Varicose veins/legs  []Spider veins/legs  []Spider veins/face  []Venous stasis ulcer  []Superficial thrombophlebitis  []Other -      She complains of []none  [x]bulging veins  []dilated veins  []discolored veins         There is [x]no edema              []right leg edema  []left leg edema       []bilateral lower extremity edema     There is   []no leg pain          []right leg pain  []left leg pain         []bilateral leg pain  []bilateral leg pain; L>R   [x]bilateral leg pain; R>L     Pain is described as []aching              [x]itching  []sharp                []burning  []throbbing         []stinging  []heavy                []dull  [x]other - intermittent tenderness    Symptoms have been ongoing for:  years There is  [x]no pertinent medical history  []history of DVT  []PE  []superficial venous thrombosis     Prior treatment includes []none  []EVLT  [x]OTC stockings  []prescription compression stockings  []vein ligation  []vein stripping  []stab phlebectomy  []sclerotherapy injections  []Other -      Current treatment includes []none  []compression socks  []avoiding tight clothing  []leg elevation  []rest  []exercise  [x]weight management  []skin care  []periodic evaluation   []Other-     Treatment has been []effective  []ineffective     59-year-old female with a history of HIV who presents to the office for evaluation of her veins  She is a nurse's aide at [employer]  She has right posterior calf truncal varicosities and left medial calf varicosities  Varicosities present for many years  She complains of tenderness intermittently with light touch  She also notes itching  She tried an over-the-counter zipper compression with no significant change in her symptoms  No lower extremity swelling  No history of venous ulceration  No history of deep or superficial venous thrombosis  Review of Systems   Constitutional: Negative  HENT: Negative  Eyes: Negative  Respiratory: Negative  Cardiovascular: Negative for leg swelling  Painful veins   Gastrointestinal: Negative  Endocrine: Negative  Genitourinary: Negative  Musculoskeletal: Negative  Skin: Negative  Allergic/Immunologic: Negative  Neurological: Negative  Hematological: Negative  Psychiatric/Behavioral: Negative  Objective       Review of Systems      The following portions of the patient's history were reviewed and updated as appropriate: allergies, current medications, past family history, past medical history, past social history, past surgical history and problem list   Review of systems reviewed    Physical Exam  Vitals signs and nursing note reviewed     Constitutional:       Appearance: Normal appearance  HENT:      Head: Normocephalic and atraumatic  Neck:      Musculoskeletal: Normal range of motion  Cardiovascular:      Pulses: Normal pulses  Heart sounds: Normal heart sounds  Pulmonary:      Effort: Pulmonary effort is normal       Breath sounds: Normal breath sounds  Abdominal:      General: Abdomen is flat  Palpations: Abdomen is soft  Musculoskeletal: Normal range of motion  General: No swelling  Skin:     General: Skin is warm  Comments: Right lateral posterior calf truncal varicosity below-the-knee and left medial calf varicosities   Neurological:      General: No focal deficit present  Mental Status: She is alert and oriented to person, place, and time  Psychiatric:         Mood and Affect: Mood normal          Behavior: Behavior normal        Objective:     Vitals:    08/21/20 1330   BP: 116/62   BP Location: Left arm   Patient Position: Sitting   Pulse: (!) 52   Resp: 18   Temp: 97 7 °F (36 5 °C)   TempSrc: Tympanic   Weight: 62 6 kg (138 lb)   Height: 5' 4" (1 626 m)       Patient Active Problem List   Diagnosis    Uterine fibroid    Menorrhagia with regular cycle    Dysmenorrhea    Anemia    Uterus, adenomyosis    Ovarian benign neoplasm    Encounter for annual routine gynecological examination    Encounter for screening mammogram for breast cancer    History of [facility] infection ([facility] [address] )    Symptomatic varicose veins, bilateral       Past Surgical History:   Procedure Laterality Date    HYSTERECTOMY  2017    age 36    AZ TOTAL ABDOM HYSTERECTOMY N/A 5/2/2016    Procedure: TOTAL ABDOMINAL HYSTERECTOMY  (RICK), REMOVAL OF BOTH TUBES, and biopsy rt   ovarian nodular;  Surgeon: Clif Rendon MD;  [facility];  Service: Gynecology       Family History   Problem Relation Age of Onset    Diabetes Mother     Diabetes Father     No Known Problems Sister     No Known Problems Brother     No Known Problems Son     No Known Problems Maternal Grandmother     No Known Problems Maternal Grandfather     No Known Problems Paternal Grandmother     No Known Problems Paternal Grandfather     No Known Problems Sister     No Known Problems Brother     No Known Problems Maternal Aunt     No Known Problems Maternal Aunt     No Known Problems Paternal Aunt     No Known Problems Paternal Aunt     No Known Problems Paternal Aunt        Social History     Socioeconomic History    Marital status: Single     Spouse name: Not on file    Number of children: Not on file    Years of education: Not on file    Highest education level: Not on file   Occupational History    Not on file   Social Needs    Financial resource strain: Not on file    Food insecurity     Worry: Not on file     Inability: Not on file    Transportation needs     Medical: Not on file     Non-medical: Not on file   Tobacco Use    Smoking status: Never Smoker    Smokeless tobacco: Never Used   Substance and Sexual Activity    Alcohol use: No    Drug use: No    Sexual activity: Yes     Partners: Male   Lifestyle    Physical activity     Days per week: Not on file     Minutes per session: Not on file    Stress: Not on file   Relationships    Social connections     Talks on phone: Not on file     Gets together: Not on file     Attends Evangelical service: Not on file     Active member of club or organization: Not on file     Attends meetings of clubs or organizations: Not on file     Relationship status: Not on file    Intimate partner violence     Fear of current or ex partner: Not on file     Emotionally abused: Not on file     Physically abused: Not on file     Forced sexual activity: Not on file   Other Topics Concern    Not on file   Social History Narrative    · Most recent tobacco use screenin2019      · Do you currently or have you served in the [employer]:   No      · Were you activated, into active duty, as a member of the [employer] or as a Reservist:   No     As per Anushka        No Known Allergies      Current Outpatient Medications:     Multiple Vitamins-Minerals (MULTIVITAMIN ADULT) TABS, Take 1 tablet by mouth daily, Disp: , Rfl:       Vitals:    08/21/20 1330   BP: 116/62   Pulse: (!) 52   Resp: 18   Temp: 97 7 °F (36 5 °C) Alert and oriented, no focal deficits, no motor or sensory deficits.

## 2020-10-30 DIAGNOSIS — Z12.31 ENCOUNTER FOR SCREENING MAMMOGRAM FOR BREAST CANCER: ICD-10-CM

## 2020-12-03 DIAGNOSIS — B20 HIV INFECTION, UNSPECIFIED SYMPTOM STATUS (HCC): Primary | ICD-10-CM

## 2020-12-03 RX ORDER — BICTEGRAVIR SODIUM, EMTRICITABINE, AND TENOFOVIR ALAFENAMIDE FUMARATE 50; 200; 25 MG/1; MG/1; MG/1
1 TABLET ORAL
COMMUNITY
Start: 2020-08-28 | End: 2020-12-03 | Stop reason: SDUPTHER

## 2020-12-03 RX ORDER — BICTEGRAVIR SODIUM, EMTRICITABINE, AND TENOFOVIR ALAFENAMIDE FUMARATE 50; 200; 25 MG/1; MG/1; MG/1
1 TABLET ORAL
Qty: 30 TABLET | Refills: 0 | Status: SHIPPED | OUTPATIENT
Start: 2020-12-03 | End: 2020-12-21 | Stop reason: SDUPTHER

## 2020-12-04 LAB
EXTERNAL HIV CONFIRMATION: NORMAL
EXTERNAL HIV SCREEN: NORMAL

## 2020-12-21 ENCOUNTER — OFFICE VISIT (OUTPATIENT)
Dept: INFECTIOUS DISEASES | Facility: CLINIC | Age: 45
End: 2020-12-21
Payer: COMMERCIAL

## 2020-12-21 VITALS
SYSTOLIC BLOOD PRESSURE: 98 MMHG | WEIGHT: 145.2 LBS | HEIGHT: 64 IN | BODY MASS INDEX: 24.79 KG/M2 | HEART RATE: 76 BPM | DIASTOLIC BLOOD PRESSURE: 60 MMHG | TEMPERATURE: 98.6 F

## 2020-12-21 DIAGNOSIS — B20 HISTORY OF HIV INFECTION (HCC): Primary | ICD-10-CM

## 2020-12-21 DIAGNOSIS — B20 HIV INFECTION, UNSPECIFIED SYMPTOM STATUS (HCC): ICD-10-CM

## 2020-12-21 DIAGNOSIS — Z91.19 NONCOMPLIANCE: ICD-10-CM

## 2020-12-21 DIAGNOSIS — D70.9 NEUTROPENIA, UNSPECIFIED TYPE (HCC): ICD-10-CM

## 2020-12-21 PROCEDURE — 99214 OFFICE O/P EST MOD 30 MIN: CPT | Performed by: INTERNAL MEDICINE

## 2020-12-21 RX ORDER — BICTEGRAVIR SODIUM, EMTRICITABINE, AND TENOFOVIR ALAFENAMIDE FUMARATE 50; 200; 25 MG/1; MG/1; MG/1
1 TABLET ORAL
Qty: 90 TABLET | Refills: 1 | Status: SHIPPED | OUTPATIENT
Start: 2020-12-21 | End: 2021-07-14

## 2021-02-11 ENCOUNTER — TELEPHONE (OUTPATIENT)
Dept: VASCULAR SURGERY | Facility: CLINIC | Age: 46
End: 2021-02-11

## 2021-05-17 ENCOUNTER — TELEPHONE (OUTPATIENT)
Dept: INFECTIOUS DISEASES | Facility: CLINIC | Age: 46
End: 2021-05-17

## 2021-05-17 NOTE — TELEPHONE ENCOUNTER
Called and lmom asking pt to please cb to schedule a virtual visit w/ Dr Jorge Rasmussen in June  -Any day is fine,late morning    -Dr Jorge Rasmussen on vacation week of 6/14

## 2021-06-17 NOTE — PROGRESS NOTES
Assessment/Plan:  Normal gynecologic exam  Hx of HIV, RICK  Vulvar depigmentation '20 with new onset nodules- Bx  Return to the office in 1 year  Self breast exams once a month  3-D mammography annually  Colonoscopy p 44- done '16 pre Hyst  Calcium 1,000 mg daily with vitamin D- to add 1 supplement  Exercise 3 times a week- at the gym '18, lost 14 lbs , gained 5 '21         Diagnoses and all orders for this visit:    Encounter for annual routine gynecological examination    Encounter for screening mammogram for breast cancer  -     Mammo screening bilateral w 3d & cad; Future    History of HIV infection ([facility] [place] [facility] )    Symptomatic varicose veins, bilateral    History of abdominal hysterectomy    Other orders  -     Cancel: Ambulatory referral to Gastroenterology; Future              Subjective:        Patient ID: Delphine Bennett is a 51 y o  female  Laban Jacobidus returns for yearly evaluation  She is without complaints  Her health has remained stable  She saw a vascular surgeon for varicose veins  Compression socks were recommended  She is up-to-date with colonoscopy  She has some reactive depressive symptoms since the unexpected death of her  in March  He was 53  Her son is autistic  The following portions of the patient's history were reviewed and updated as appropriate: She  has a past medical history of Anemia and HIV (human immunodeficiency virus infection) ([facility] [place] [facility] )    Patient Active Problem List    Diagnosis Date Noted    Uterine fibroid 2016    Uterus, adenomyosis 2016    Symptomatic varicose veins, bilateral 2020    Encounter for annual routine gynecological examination 2019    Encounter for screening mammogram for breast cancer 2019    History of HIV infection ([facility] [place] [facility] ) 2019    Ovarian benign neoplasm 2016    Menorrhagia with regular cycle 2016    Dysmenorrhea 2016    Anemia 2016   PMH:   2 para 1; SAVD '98, VIP '00  Vulvar growths- JAKUB 3/CIS; LONG 1   Laser ablation of the vulva, cervix, perianal region  Dr Cj Jimenez  HIV positive  LONG-3, HR-HPV; LEEP, normal ECC, EMB   Menorrhagia with anemia- hemoglobin of 7; transfused 3/14  Enlarging fibroid '15- failed oral contraceptives for menorrhagia, stopped   Dysmenorrhea, menorrhagia, failed conservative therapy, dyspareunia- RICK- Bilateral Salpingectomy, R Ovarian Bx- 16      Anemia- Fe infusions    She  has a past surgical history that includes pr total abdom hysterectomy (N/A, 2016) and Hysterectomy ()  Her family history includes Diabetes in her father and mother; No Known Problems in her brother, brother, maternal aunt, maternal aunt, maternal grandfather, maternal grandmother, paternal aunt, paternal aunt, paternal aunt, paternal grandfather, paternal grandmother, sister, sister, and son  FH:  M- Breast Ca 69- - Lumpectomy, RADS, Tamoxifen  F- Prostate Ca 68- , L Kidney Ca  67  She  reports that she has never smoked  She has never used smokeless tobacco  She reports that she does not drink alcohol and does not use drugs  SH:  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, [place] since   She was born in [facility] in the [facility]   age 23  Condoms were used for protection for her  who was HIV negative   They were  and he unexpectedly  in March of an MI  Their son is autistic  Current Outpatient Medications   Medication Sig Dispense Refill    Multiple Vitamins-Minerals (MULTIVITAMIN ADULT) TABS Take 1 tablet by mouth daily      Biktarvy -25 MG tablet Take 1 tablet by mouth daily with breakfast 90 tablet 1     No current facility-administered medications for this visit       Current Outpatient Medications on File Prior to Visit   Medication Sig    Multiple Vitamins-Minerals (MULTIVITAMIN ADULT) TABS Take 1 tablet by mouth daily    Biktarvy -25 MG tablet Take 1 tablet by mouth daily with breakfast     No current facility-administered medications on file prior to visit  She has No Known Allergies       Review of Systems   Constitutional: Negative for activity change, appetite change, fatigue and unexpected weight change  Eyes: Negative for visual disturbance  Respiratory: Negative for cough, chest tightness, shortness of breath and wheezing  Cardiovascular: Negative for chest pain, palpitations and leg swelling  Breast: Patient denies tenderness, nipple discharge, masses, or erythema  Gastrointestinal: Negative for abdominal distention, abdominal pain, blood in stool, constipation, diarrhea, nausea and vomiting  Endocrine: Negative for cold intolerance and heat intolerance  Genitourinary: Negative for decreased urine volume, difficulty urinating, dysuria, frequency, hematuria, menstrual problem, pelvic pain, urgency, vaginal bleeding, vaginal discharge and vaginal pain  Musculoskeletal: Positive for back pain  Negative for arthralgias  Skin: Negative for rash  Neurological: Negative for weakness, light-headedness, numbness and headaches  Hematological: Does not bruise/bleed easily  Psychiatric/Behavioral: Positive for dysphoric mood  Negative for agitation, behavioral problems and sleep disturbance  The patient is not nervous/anxious  Objective: There were no vitals filed for this visit  Physical Exam  Vitals and nursing note reviewed  Constitutional:       Appearance: She is well-developed  HENT:      Head: Normocephalic and atraumatic  Eyes:      General: No scleral icterus  Right eye: No discharge  Left eye: No discharge  Extraocular Movements: Extraocular movements intact  Conjunctiva/sclera: Conjunctivae normal    Neck:      Thyroid: No thyromegaly  Trachea: No tracheal deviation  Cardiovascular:      Rate and Rhythm: Normal rate and regular rhythm        Heart sounds: Normal heart sounds  No murmur heard  Pulmonary:      Effort: Pulmonary effort is normal  No respiratory distress  Breath sounds: Normal breath sounds  No wheezing  Chest:      Breasts: Breasts are symmetrical          Right: No inverted nipple, mass, nipple discharge, skin change or tenderness  Left: No inverted nipple, mass, nipple discharge, skin change or tenderness  Abdominal:      General: Bowel sounds are normal  There is no distension  Palpations: Abdomen is soft  There is no mass  Tenderness: There is no abdominal tenderness  Genitourinary:     Labia:         Right: No rash, tenderness or lesion  Left: No rash, tenderness or lesion  Vagina: Normal       Adnexa:         Right: No mass, tenderness or fullness  Left: No mass, tenderness or fullness  Rectum: No external hemorrhoid  Comments: Urethral meatus within normal limits  Perineum within normal limits  Bladder well supported  Uterus and cervix surgically removed  Loss of pigmentation at the fourchette, less than 1 cm diameter  That is the same but there are 2 areas of raised slightly darker nodules  A biopsy is recommended  Musculoskeletal:         General: Normal range of motion  Cervical back: Normal range of motion and neck supple  Skin:     General: Skin is warm and dry  Neurological:      Mental Status: She is alert and oriented to person, place, and time  Psychiatric:         Behavior: Behavior normal          Thought Content:  Thought content normal          Judgment: Judgment normal

## 2021-06-23 ENCOUNTER — ANNUAL EXAM (OUTPATIENT)
Dept: OBGYN CLINIC | Facility: CLINIC | Age: 46
End: 2021-06-23
Payer: COMMERCIAL

## 2021-06-23 DIAGNOSIS — Z12.31 ENCOUNTER FOR SCREENING MAMMOGRAM FOR BREAST CANCER: ICD-10-CM

## 2021-06-23 DIAGNOSIS — Z01.419 ENCOUNTER FOR ANNUAL ROUTINE GYNECOLOGICAL EXAMINATION: Primary | ICD-10-CM

## 2021-06-23 DIAGNOSIS — I83.893 SYMPTOMATIC VARICOSE VEINS, BILATERAL: ICD-10-CM

## 2021-06-23 DIAGNOSIS — Z90.710 HISTORY OF ABDOMINAL HYSTERECTOMY: ICD-10-CM

## 2021-06-23 DIAGNOSIS — B20 HISTORY OF HIV INFECTION (HCC): ICD-10-CM

## 2021-06-23 PROCEDURE — S0612 ANNUAL GYNECOLOGICAL EXAMINA: HCPCS | Performed by: OBSTETRICS & GYNECOLOGY

## 2021-07-01 PROCEDURE — 56605 BIOPSY OF VULVA/PERINEUM: CPT | Performed by: OBSTETRICS & GYNECOLOGY

## 2021-07-01 NOTE — PROGRESS NOTES
Assessment/Plan:   successful Vulvar biopsy   instructions given   given the option to return for removal of sutures  Diagnoses and all orders for this visit:    Vulvar lesion    History of HIV infection ([facility] )              Subjective:        Patient ID: Inocencio Mooney is a 51 y o  female  HPI    The following portions of the patient's history were reviewed and updated as appropriate: She  has a past medical history of Anemia and HIV (human immunodeficiency virus infection) ([facility] )  Patient Active Problem List    Diagnosis Date Noted    Uterine fibroid 05/02/2016    Uterus, adenomyosis 05/05/2016    Symptomatic varicose veins, bilateral 08/21/2020    Encounter for annual routine gynecological examination 03/31/2019    Encounter for screening mammogram for breast cancer 03/31/2019    History of HIV infection ([facility] ) 03/31/2019    Ovarian benign neoplasm 05/05/2016    Menorrhagia with regular cycle 05/02/2016    Dysmenorrhea 05/02/2016    Anemia 05/02/2016     She  has a past surgical history that includes pr total abdom hysterectomy (N/A, 5/2/2016) and Hysterectomy (2017)  Her family history includes Diabetes in her father and mother; No Known Problems in her brother, brother, maternal aunt, maternal aunt, maternal grandfather, maternal grandmother, paternal aunt, paternal aunt, paternal aunt, paternal grandfather, paternal grandmother, sister, sister, and son  She  reports that she has never smoked  She has never used smokeless tobacco  She reports that she does not drink alcohol and does not use drugs  Current Outpatient Medications   Medication Sig Dispense Refill    Biktarvy -25 MG tablet Take 1 tablet by mouth daily with breakfast 90 tablet 1    Multiple Vitamins-Minerals (MULTIVITAMIN ADULT) TABS Take 1 tablet by mouth daily       No current facility-administered medications for this visit       Current Outpatient Medications on File Prior to Visit   Medication Sig   [facility] -89 MG tablet Take 1 tablet by mouth daily with breakfast    Multiple Vitamins-Minerals (MULTIVITAMIN ADULT) TABS Take 1 tablet by mouth daily     No current facility-administered medications on file prior to visit  She has No Known Allergies       Review of Systems      Objective: There were no vitals filed for this visit  Physical Exam    Biopsy    Date/Time: 7/1/2021 4:34 PM  Performed by: Elmo Kwon MD  Authorized by: Elmo Kwon MD   Universal Protocol:  Consent: Verbal consent obtained  Written consent obtained  Risks and benefits: risks, benefits and alternatives were discussed  Consent given by: patient  Time out: Immediately prior to procedure a "time out" was called to verify the correct patient, procedure, equipment, support staff and site/side marked as required  Patient understanding: patient states understanding of the procedure being performed  Patient consent: the patient's understanding of the procedure matches consent given  Procedure consent: procedure consent matches procedure scheduled  Relevant documents: relevant documents present and verified  Radiology Images displayed and confirmed  If images not available, report reviewed: imaging studies available  Patient identity confirmed: verbally with patient      Procedure Details - Skin Biopsy:     Biopsy tissue type: skin and subcutaneous    Body area: Anogenital    Anogenital location:  Vulva    Vaginal Lesion: No      Initial size (mm):  10    Final defect size (mm):  10    Malignancy: benign lesion        Informed consent was obtained  the area was prepped with Betadine  the 2 nodules above both normal and deep pigmented tissue were smaller than last seen   3 5 cc of 1% lidocaine with epinephrine were injected beneath the lesion   the area was grasped and a 11  Blade was used to excise the area   3 sutures of 3-0 Vicryl were placed with almost complete hemostasis  The area was cleansed with soap and water   A 4 x 4 was used to compress the area for 4 minutes to achieve total hemostasis

## 2021-07-02 ENCOUNTER — PROCEDURE VISIT (OUTPATIENT)
Dept: OBGYN CLINIC | Facility: CLINIC | Age: 46
End: 2021-07-02
Payer: COMMERCIAL

## 2021-07-02 VITALS — BODY MASS INDEX: 24.1 KG/M2 | WEIGHT: 140.4 LBS | SYSTOLIC BLOOD PRESSURE: 107 MMHG | DIASTOLIC BLOOD PRESSURE: 60 MMHG

## 2021-07-02 DIAGNOSIS — N90.89 VULVAR LESION: Primary | ICD-10-CM

## 2021-07-02 DIAGNOSIS — B20 HISTORY OF HIV INFECTION (HCC): ICD-10-CM

## 2021-07-02 PROCEDURE — 88305 TISSUE EXAM BY PATHOLOGIST: CPT | Performed by: PATHOLOGY

## 2021-07-09 ENCOUNTER — TELEPHONE (OUTPATIENT)
Dept: HEMATOLOGY ONCOLOGY | Facility: CLINIC | Age: 46
End: 2021-07-09

## 2021-07-09 ENCOUNTER — TELEPHONE (OUTPATIENT)
Dept: OBGYN CLINIC | Facility: CLINIC | Age: 46
End: 2021-07-09

## 2021-07-09 NOTE — TELEPHONE ENCOUNTER
Pt has two pimple on both sides of her inner thigh hurts for her to walk   She is also wondering about her biopsy

## 2021-07-09 NOTE — RESULT ENCOUNTER NOTE
I am putting a referral in for gyn Oncology  Please call the patient with the phone number  I have reviewed the report with her and she knows she will need to schedule her own appointment  I just called the patient with the results revealing squamous cell carcinoma of the vulva  She understands additional surgery is needed

## 2021-07-09 NOTE — TELEPHONE ENCOUNTER
New Patient Request   Patient Details:     Ashish May      1975      [id number]      Reason for Appointment   Who is calling to schedule? Patient   If not Patient, what is their name? What is the diagnosis? Primary vulvar squamous cell carcinoma   Who is the referring doctor? Dr Ramírez    Scheduling Information   Which department are you scheduling with ? [address] Number to call back on? If calling from the [facility], use the Nurse number   [phone]   Miscellaneous Information:     Please advise the patient, a new patient  will be calling them back within 1 business day

## 2021-07-09 NOTE — TELEPHONE ENCOUNTER
----- Message from Elmo Kwon MD sent at 7/9/2021  1:06 PM EDT -----   I am putting a referral in for gyn Oncology  Please call the patient with the phone number  I have reviewed the report with her and she knows she will need to schedule her own appointment  I just called the patient with the results revealing squamous cell carcinoma of the vulva  She understands additional surgery is needed

## 2021-07-09 NOTE — TELEPHONE ENCOUNTER
I called and spoke to pt  She said she saw Dr Jasson Jarrett this week for biopsy, unsure if these were present or not at time of visit  Looks like blackheads almost but unsure, hurts to walk  Wanted to be seen  Informed that [address] will be out next week but we can schedule her with another provider  Appt made with GERRY on 7/15   Pt verbalized understanding

## 2021-07-12 ENCOUNTER — TELEPHONE (OUTPATIENT)
Dept: HEMATOLOGY ONCOLOGY | Facility: CLINIC | Age: 46
End: 2021-07-12

## 2021-07-12 PROBLEM — C51.9 VULVAR CARCINOMA (HCC): Status: ACTIVE | Noted: 2021-07-12

## 2021-07-12 NOTE — TELEPHONE ENCOUNTER
New Patient Encounter    New Patient Intake Form   Patient Details:  Felisha Miguel  1975  [id number]    Background Information:  [address] starts by opening a telephone encounter and gathering the following information   Who is calling to schedule? If not self, relationship to patient? Patient   Referring Provider Mariano Rod   What is the diagnosis? Vulvar cancer   Is this Cancer or Non-Cancer? Cancer   Is this diagnosis confirmed? Yes   When was the diagnosis? 7/2021   Is there a confirmed diagnosis from a biopsy/tissue reviewed by pathology? Yes   Were outside slides requested? NA   Is patient aware of diagnosis? Yes   Is there a personal history and what kind? No   Is there a family history and what kind? renal   Reason for visit? New Diagnosis   Have you had any imaging or labs done? If so: when, where? no     Are records in [employer]? yes   If patient has a prior history of cancer were old records obtained? NA   Was the patient told to bring a disk? No   Does the patient smoke or Vape? No   If yes, how many packs or cartridges per day? Scheduling Information:   Preferred [place]:  [address] [place] [address]     Are there any dates/time the patient cannot be seen? no   Miscellaneous:    After completing the above information, please route to Financial Counselor and the appropriate Nurse Navigator for review

## 2021-07-14 ENCOUNTER — TELEPHONE (OUTPATIENT)
Dept: CARDIAC SURGERY | Facility: CLINIC | Age: 46
End: 2021-07-14

## 2021-07-14 ENCOUNTER — TELEPHONE (OUTPATIENT)
Dept: HEMATOLOGY ONCOLOGY | Facility: CLINIC | Age: 46
End: 2021-07-14

## 2021-07-14 DIAGNOSIS — B20 HIV INFECTION, UNSPECIFIED SYMPTOM STATUS (HCC): ICD-10-CM

## 2021-07-14 RX ORDER — BICTEGRAVIR SODIUM, EMTRICITABINE, AND TENOFOVIR ALAFENAMIDE FUMARATE 50; 200; 25 MG/1; MG/1; MG/1
1 TABLET ORAL
Qty: 30 TABLET | Refills: 0 | Status: SHIPPED | OUTPATIENT
Start: 2021-07-14 | End: 2021-08-09 | Stop reason: SDUPTHER

## 2021-07-14 NOTE — TELEPHONE ENCOUNTER
I called pt and let her know she missed her appt for today with Ronald Glynn  She thought her appt was for Thursday  I gave her [facility] number and told her to select opt #2 to reschedule her new pt appt

## 2021-07-14 NOTE — TELEPHONE ENCOUNTER
Patient missed her consult appt today with Dr Casimiro Arango and would like to reschedule, she could best be reached at [phone]

## 2021-07-15 ENCOUNTER — OFFICE VISIT (OUTPATIENT)
Dept: OBGYN CLINIC | Facility: CLINIC | Age: 46
End: 2021-07-15
Payer: COMMERCIAL

## 2021-07-15 VITALS
SYSTOLIC BLOOD PRESSURE: 104 MMHG | WEIGHT: 136.8 LBS | DIASTOLIC BLOOD PRESSURE: 78 MMHG | HEIGHT: 64 IN | BODY MASS INDEX: 23.35 KG/M2

## 2021-07-15 DIAGNOSIS — L73.9 FOLLICULITIS: Primary | ICD-10-CM

## 2021-07-15 PROCEDURE — 99213 OFFICE O/P EST LOW 20 MIN: CPT | Performed by: NURSE PRACTITIONER

## 2021-07-15 NOTE — PROGRESS NOTES
Assessment/Plan:    Folliculitis  Exam c/w folliculitis  Advised warm soaks QID, OTC tripe ABX BID, leave open to air and avoid rubbing and call if worsens  Diagnoses and all orders for this visit:    Folliculitis        Subjective:      Patient ID: Elisha Mckeon is a 51 y o  female  Donna Donahue is a 51year old female who presents with complaint of 'pimples" on her inner thighs x 2 weeks  They are raised without drainage  They were initially painful but no longer are  Hot water makes them better  She denies any OTC treatments   She denies abdominal/pelvic pain, UTI symptoms or unusual vaginal discharge, itching or odor  She is not sexually active  She had recent left sided vulvar bx  Review of Systems   Constitutional: Negative for chills, fatigue and fever  HENT: Negative for sore throat  Respiratory: Negative for cough and shortness of breath  Gastrointestinal: Negative for abdominal pain  Genitourinary: Negative for difficulty urinating, dysuria, flank pain, frequency, pelvic pain and vaginal discharge  + for pimples on thighs   Neurological: Negative  Psychiatric/Behavioral: Negative  Objective:      /78 (BP Location: Right arm, Patient Position: Sitting, Cuff Size: Standard)   Ht 5' 4" (1 626 m)   Wt 62 1 kg (136 lb 12 8 oz)   BMI 23 48 kg/m²          Physical Exam  Constitutional:       Appearance: Normal appearance  She is normal weight  Pulmonary:      Effort: Pulmonary effort is normal    Abdominal:      Tenderness: There is no abdominal tenderness  Genitourinary:     Labia:         Right: No rash or tenderness  Left: No rash or tenderness  Skin:     General: Skin is warm and dry  Capillary Refill: Capillary refill takes less than 2 seconds  Neurological:      Mental Status: She is alert and oriented to person, place, and time     Psychiatric:         Mood and Affect: Mood normal          Behavior: Behavior normal

## 2021-07-15 NOTE — ASSESSMENT & PLAN NOTE
Exam c/w folliculitis  Advised warm soaks QID, OTC tripe ABX BID, leave open to air and avoid rubbing and call if worsens

## 2021-07-19 NOTE — PROGRESS NOTES
Assessment/Plan:    Problem List Items Addressed This Visit        Genitourinary    Vulvar carcinoma ([facility] ) - Primary     52yo with h/o HIV and new diagnosis of vulvar cancer presents for consultation      Pathology:  A  Vulva, vulvar lesion, biopsy:   - Small focus (0 1 mm) of superficially invasive squamous cell carcinoma, moderately differentiated,     arising in extensive high-grade squamous intraepithelial lesion/vulvar intraepithelial neoplasia     grade 2-3 of usual type (HSIL/uVIN 2-3) with adnexal extension     -- Depth of invasion: 0 2 mm     -- Examined margins uninvolved by invasive carcinoma; invasive carcinoma 0 3 mm from nearest        peripheral margin     -- Examined margins involved by HSIL     D/w pt her abnormal biopsy results showing high grade lesion with focus of carcinoma  D/w pt HPV related dysplasia and its progressive course in becoming cancer if not cleared and given her diagnosis of HIV she is at a higher risk of progression  We discussed the superficial/focal area of carcinoma which is <1mm in depth  This would allow for complete resection with wide local resection should there not be any further carcinoma identified on resection  D/w pt that pending the pathology results, subsequent treatment would be further discussed and possible further surgical intervention/lymph node dissection could be necessary but potentially simple vulvectomy could be sufficient  All questions answered  Discussed post op recovery and expectations       Proceed with Wide local resection  PATs           Relevant Orders    Case request operating room: VULVECTOMY SIMPLE (Completed)    Type and screen    Basic metabolic panel    CBC and Platelet       Other    History of HIV infection ([facility] )     Compliant with meds  Followed by ID                    CHIEF COMPLAINT: vulvar cancer     Oncology Problem:  Cancer Staging  No matching staging information was found for the patient      Previous therapy:  Oncology History    No history exists  Most recent imaging:  Mammo screening bilateral w 3d & cad  Narrative: DIAGNOSIS: Encounter for screening mammogram for breast cancer     TECHNIQUE:  Digital screening mammography was performed  Computer Aided Detection   (CAD) analyzed all applicable images  COMPARISONS: Prior breast imaging dated: 09/30/2016, 05/13/2015,   03/21/2014, and 12/22/2010    RELEVANT HISTORY:   Family Breast Cancer History: No known family history of breast cancer  Family Medical History: No known relevant family medical history  Personal History: Hormone history includes birth control  Surgical history   includes hysterectomy  No known relevant medical history  The patient is scheduled in a reminder system for screening mammography  8-10% of cancers will be missed on mammography  Management of a palpable   abnormality must be based on clinical grounds  Patients will be notified   of their results via letter from our facility  Accredited by [employer] and FDA  RISK ASSESSMENT:   5 Year Tyrer-Cuzick: 0 54 %  10 Year Tyrer-Cuzick: 1 25 %  Lifetime Tyrer-Cuzick: 7 1 %    TISSUE DENSITY:   The breasts are extremely dense, which lowers the sensitivity of   mammography  INDICATION: Cha De La O is a 38 y o  female presenting for screening   mammography  FINDINGS:   There are no suspicious masses, grouped microcalcifications or areas of   architectural distortion  The skin and nipple areolar complex are   unremarkable  Impression: No mammographic evidence of malignancy  ASSESSMENT/BI-RADS CATEGORY:  Left: 1 - Negative  Right: 1 - Negative  Overall: 1 - Negative    RECOMMENDATION:       - Routine screening mammogram in 1 year for both breasts  Workstation ID: [id number]        Patient ID: Cha De La O is a 51 y o  female  54yo with h/o HIV and new diagnosis of vulvar cancer presents for consultation   Pt has h/o hysterectomy in 2016 for benign indications  She had an annual gyn visit with noted vulvar nodules  She reported depigmentation since  and even noted a lesion after her hysterectomy but did not have it checked out  She has no complaints at this time other than noticing the lesion  No vaginal bleeding/discharge  No burning/itfching at the site  She is under treatment with ID for HIV with recent labs drawn and adequate per pt report  H/o hysterectomy for fibroids/menorrhagia  Reports normal paps prior  Review of Systems   Constitutional: Negative for appetite change, chills, fatigue and fever  Respiratory: Negative for chest tightness and shortness of breath  Gastrointestinal: Negative for abdominal distention, abdominal pain, constipation, diarrhea and nausea  Genitourinary: Negative for difficulty urinating, flank pain, frequency, urgency, vaginal bleeding, vaginal discharge and vaginal pain  Musculoskeletal: Negative for back pain, joint swelling and myalgias  Skin: Negative for rash  Neurological: Negative for dizziness, light-headedness, numbness and headaches  Current Outpatient Medications   Medication Sig Dispense Refill    Biktarvy -25 MG tablet Take 1 tablet by mouth daily with breakfast (Patient not taking: Reported on 7/15/2021) 30 tablet 0    Multiple Vitamins-Minerals (MULTIVITAMIN ADULT) TABS Take 1 tablet by mouth daily       No current facility-administered medications for this visit  No Known Allergies    Past Medical History:   Diagnosis Date    Anemia     HIV [facility]human immunodeficiency virus infection) ([facility] )        Past Surgical History:   Procedure Laterality Date    HYSTERECTOMY      age 36    [facility] TOTAL ABDOM HYSTERECTOMY N/A 2016    Procedure: TOTAL ABDOMINAL HYSTERECTOMY  (RICK), REMOVAL OF BOTH TUBES, and biopsy rt   ovarian nodular;  Surgeon: Jone Mendoza MD;  Location: [facility];  Service: Gynecology       OB History        3    Para   1    Term   1     AB        Living   1       SAB        TAB        Ectopic        Multiple        Live Births                     Family History   Problem Relation Age of Onset    Diabetes Mother     Diabetes Father     No Known Problems Sister     No Known Problems Brother     No Known Problems Son     No Known Problems Maternal Grandmother     No Known Problems Maternal Grandfather     No Known Problems Paternal Grandmother     No Known Problems Paternal Grandfather     No Known Problems Sister     No Known Problems Brother     No Known Problems Maternal Aunt     No Known Problems Maternal Aunt     No Known Problems Paternal Aunt     No Known Problems Paternal Aunt     No Known Problems Paternal Aunt        The following portions of the patient's history were reviewed and updated as appropriate: allergies, current medications, past family history, past medical history, past social history, past surgical history and problem list       Objective:    not currently breastfeeding  There is no height or weight on file to calculate BMI  Physical Exam  HENT:      Head: Normocephalic and atraumatic  Cardiovascular:      Rate and Rhythm: Normal rate and regular rhythm  Pulmonary:      Effort: Pulmonary effort is normal    Abdominal:      Palpations: Abdomen is soft  Genitourinary:         Comments: The external female genitalia is normal  White lesion at the left posterior fourchette extending into vaginal mucosa  The bartholin's, uretheral and skenes glands are normal  The urethral meatus is normal (midline with no lesions)  Anus without fissure or lesion  Speculum exam reveals a grossly normal vagina cuff  No masses, lesions,discharge or bleeding  No significant cystocele or rectocele noted  Bimanual exam notes a surgical absent cervix, uterus  No masses or fullness  Bladder is without fullness, mass or tenderness  Musculoskeletal:         General: Normal range of motion        Cervical back: Normal range of motion  Skin:     General: Skin is warm and dry  Neurological:      Mental Status: She is alert and oriented to person, place, and time             No results found for:   Lab Results   Component Value Date    WBC 7 77 05/03/2016    HGB 7 2 (L) 05/03/2016    HCT 24 9 (L) 05/03/2016    MCV 83 05/03/2016     05/03/2016     Lab Results   Component Value Date     01/09/2015    K 3 3 (L) 01/09/2015     01/09/2015    CO2 29 01/09/2015    ANIONGAP 1 (L) 01/09/2015    BUN 12 01/09/2015    CREATININE 0 43 (L) 01/09/2015    GLUCOSE 100 01/09/2015    CALCIUM 8 4 01/09/2015        Trend:  No results found for:

## 2021-07-19 NOTE — ASSESSMENT & PLAN NOTE
54yo with h/o HIV and new diagnosis of vulvar cancer presents for consultation      Pathology:  A  Vulva, vulvar lesion, biopsy:   - Small focus (0 1 mm) of superficially invasive squamous cell carcinoma, moderately differentiated,     arising in extensive high-grade squamous intraepithelial lesion/vulvar intraepithelial neoplasia     grade 2-3 of usual type (HSIL/uVIN 2-3) with adnexal extension     -- Depth of invasion: 0 2 mm     -- Examined margins uninvolved by invasive carcinoma; invasive carcinoma 0 3 mm from nearest        peripheral margin     -- Examined margins involved by HSIL     D/w pt her abnormal biopsy results showing high grade lesion with focus of carcinoma  D/w pt HPV related dysplasia and its progressive course in becoming cancer if not cleared and given her diagnosis of HIV she is at a higher risk of progression  We discussed the superficial/focal area of carcinoma which is <1mm in depth  This would allow for complete resection with wide local resection should there not be any further carcinoma identified on resection  D/w pt that pending the pathology results, subsequent treatment would be further discussed and possible further surgical intervention/lymph node dissection could be necessary but potentially simple vulvectomy could be sufficient  All questions answered   Discussed post op recovery and expectations       Proceed with Wide local resection  PATs

## 2021-07-20 ENCOUNTER — TELEPHONE (OUTPATIENT)
Dept: HEMATOLOGY ONCOLOGY | Facility: CLINIC | Age: 46
End: 2021-07-20

## 2021-07-20 ENCOUNTER — CONSULT (OUTPATIENT)
Dept: GYNECOLOGIC ONCOLOGY | Facility: CLINIC | Age: 46
End: 2021-07-20
Payer: COMMERCIAL

## 2021-07-20 VITALS
RESPIRATION RATE: 16 BRPM | HEIGHT: 64 IN | BODY MASS INDEX: 23.73 KG/M2 | DIASTOLIC BLOOD PRESSURE: 62 MMHG | WEIGHT: 139 LBS | HEART RATE: 56 BPM | TEMPERATURE: 97.7 F | SYSTOLIC BLOOD PRESSURE: 98 MMHG

## 2021-07-20 DIAGNOSIS — B20 HISTORY OF HIV INFECTION (HCC): ICD-10-CM

## 2021-07-20 DIAGNOSIS — C51.9 VULVAR CARCINOMA (HCC): Primary | ICD-10-CM

## 2021-07-20 PROCEDURE — 99203 OFFICE O/P NEW LOW 30 MIN: CPT | Performed by: OBSTETRICS & GYNECOLOGY

## 2021-07-20 NOTE — TELEPHONE ENCOUNTER
Appointment Confirmation     Appointment with  Prakash Colvin    Appointment date & time 07/20 at 1:00pm   [facility]   Patient verbilized Understanding  yes

## 2021-07-26 ENCOUNTER — TELEPHONE (OUTPATIENT)
Dept: HEMATOLOGY ONCOLOGY | Facility: CLINIC | Age: 46
End: 2021-07-26

## 2021-07-27 ENCOUNTER — TELEPHONE (OUTPATIENT)
Dept: GYNECOLOGIC ONCOLOGY | Facility: CLINIC | Age: 46
End: 2021-07-27

## 2021-07-27 NOTE — TELEPHONE ENCOUNTER
Patient is following up on yesterday phone call and would like a call back to schedule her procedure with Dr Jack Burton, please call back patient at [phone]

## 2021-08-03 ENCOUNTER — TELEPHONE (OUTPATIENT)
Dept: GYNECOLOGIC ONCOLOGY | Facility: CLINIC | Age: 46
End: 2021-08-03

## 2021-08-03 NOTE — TELEPHONE ENCOUNTER
Patient stopped by today to drop off FMLA  She also wanted to let you know that she did not receive surgery packet in the mail  It was supposed to be sent via [employer]ex  Please reach out to patient as she has several questions

## 2021-08-06 ENCOUNTER — TELEPHONE (OUTPATIENT)
Dept: GYNECOLOGIC ONCOLOGY | Facility: CLINIC | Age: 46
End: 2021-08-06

## 2021-08-09 ENCOUNTER — OFFICE VISIT (OUTPATIENT)
Dept: INFECTIOUS DISEASES | Facility: CLINIC | Age: 46
End: 2021-08-09
Payer: COMMERCIAL

## 2021-08-09 VITALS
RESPIRATION RATE: 17 BRPM | TEMPERATURE: 98 F | HEART RATE: 82 BPM | DIASTOLIC BLOOD PRESSURE: 68 MMHG | SYSTOLIC BLOOD PRESSURE: 108 MMHG | WEIGHT: 138 LBS | HEIGHT: 64 IN | BODY MASS INDEX: 23.56 KG/M2

## 2021-08-09 DIAGNOSIS — B20 HIV INFECTION, UNSPECIFIED SYMPTOM STATUS (HCC): Primary | ICD-10-CM

## 2021-08-09 DIAGNOSIS — B20 HIV INFECTION, UNSPECIFIED SYMPTOM STATUS (HCC): ICD-10-CM

## 2021-08-09 DIAGNOSIS — C51.9 VULVAR CARCINOMA (HCC): ICD-10-CM

## 2021-08-09 DIAGNOSIS — D72.819 LEUKOPENIA, UNSPECIFIED TYPE: ICD-10-CM

## 2021-08-09 PROCEDURE — 99214 OFFICE O/P EST MOD 30 MIN: CPT | Performed by: INTERNAL MEDICINE

## 2021-08-09 RX ORDER — BICTEGRAVIR SODIUM, EMTRICITABINE, AND TENOFOVIR ALAFENAMIDE FUMARATE 50; 200; 25 MG/1; MG/1; MG/1
1 TABLET ORAL
Qty: 90 TABLET | Refills: 1 | Status: SHIPPED | OUTPATIENT
Start: 2021-08-09 | End: 2022-02-02

## 2021-08-09 NOTE — PROGRESS NOTES
Progress Note - Infectious Disease   Rosetta Radames Cushing 51 y o  female MRN: [id number]  Unit/Bed#:  Encounter: [id number]      Impression/Recommendations:  1   HIV disease   This had been well controlled with Atripla   ART was changed to [address] due to side effect from Atripla   Patient is tolerating it well   Her HIV disease remains well controlled with CD4 almost 800 and VL undetectable  Continue  Biktarvy  Follow-up in 6 months with labs      2   Leukopenia/neutropenia  This may be medication related  WBC/ANC remains stable  If WBC/ANC decrease any further, we may need to change ART  Monitor WBC/ANC  No change in medication for now      3  Noncompliance with medication and follow-up previously    Patient is now complying with medications again  Rhonda Lax is showing compliance  4   Recently diagnosed vulvar lesion, possible vulvar carcinoma  Plan for resection later this month noted      Discussed with patient in detail regarding the above plan  Follow-up in 6 months with labs      Antibiotics:  Biktarvy     Subjective:  Patient was recently diagnosed with possible vulvar carcinoma  She is due to undergo resection later this month  Otherwise, she feels well  She is tolerating Biktarvy well  The following portions of the patient's history were reviewed and updated as appropriate: allergies, current medications, past medical history, past social history, past surgical history and problem list    ROS:  A complete review of systems was done  Except for what is noted above, the rest of the review of systems was negative  Objective:  Vitals:  Temperature: 98 °F (36 7 °C)    Physical Exam:     General: Awake, alert, cooperative, no distress  Neck:  Supple  No lymphadenopathy  No mass  Lungs: Expansion symmetric, no rales, no wheezing, respirations unlabored  Heart:  Regular rate and rhythm, S1 and S2 normal, no murmur     Abdomen: Soft, nondistended, non-tender, bowel sounds active all four quadrants,        no masses, no organomegaly  Extremities: No edema  No erythema/warmth  No ulcer  Nontender to palpation  Skin:  No rash  Neuro: Moves all extremities  Invasive Devices     None                 Labs studies:   I have personally reviewed pertinent labs  Imaging Studies:   I have personally reviewed pertinent imaging study reports and images in PACS  EKG, Pathology, and Other Studies:   I have personally reviewed pertinent reports

## 2021-08-11 ENCOUNTER — ANESTHESIA EVENT (OUTPATIENT)
Dept: PERIOP | Facility: HOSPITAL | Age: 46
End: 2021-08-11
Payer: COMMERCIAL

## 2021-08-11 LAB
ABO GROUP BLD: NORMAL
BLD GP AB SCN SERPL QL: NORMAL
RH BLD: NORMAL

## 2021-08-16 NOTE — PRE-PROCEDURE INSTRUCTIONS
Pre-Surgery Instructions:   Medication Instructions    Biktarvy -25 MG tablet Instructed to take per normal schedule except DOS    Multiple Vitamins-Minerals (MULTIVITAMIN ADULT) TABS Instructed to avoid all ASA/NSAIDs and OTC Vit/Supp from now until after surgery per anesthesia guidelines  Tylenol ok prn   All pre-procedure instructions as per [facility] My Surgical Experience w/ pt verb understanding  Inst NPO post MN night before sx  Will be taking no meds on morning of sx  Will be takign her bikarvy night before sx as she typically does, but not on am of sx  Instructed to avoid all ASA/NSAIDs and OTC Vit/Supp from now until after surgery per anesthesia guidelines  Tylenol ok prn  Reviewed pre-op showering instructions, inst to use antibacterial soap  Current hospital visitor & masking policy reviewed  Pt verbalized an understanding of all instructions reviewed and offers no concerns at this time

## 2021-08-20 ENCOUNTER — TELEPHONE (OUTPATIENT)
Dept: GYNECOLOGIC ONCOLOGY | Facility: CLINIC | Age: 46
End: 2021-08-20

## 2021-08-20 ENCOUNTER — HOSPITAL ENCOUNTER (OUTPATIENT)
Facility: HOSPITAL | Age: 46
Setting detail: OUTPATIENT SURGERY
Discharge: HOME | End: 2021-08-20
Attending: OBSTETRICS & GYNECOLOGY | Admitting: OBSTETRICS & GYNECOLOGY
Payer: COMMERCIAL

## 2021-08-20 ENCOUNTER — ANESTHESIA (OUTPATIENT)
Dept: PERIOP | Facility: HOSPITAL | Age: 46
End: 2021-08-20
Payer: COMMERCIAL

## 2021-08-20 VITALS
TEMPERATURE: 97 F | DIASTOLIC BLOOD PRESSURE: 70 MMHG | HEART RATE: 60 BPM | WEIGHT: 135 LBS | BODY MASS INDEX: 23.05 KG/M2 | SYSTOLIC BLOOD PRESSURE: 112 MMHG | HEIGHT: 64 IN | RESPIRATION RATE: 18 BRPM | OXYGEN SATURATION: 100 %

## 2021-08-20 DIAGNOSIS — C51.9 VULVAR CARCINOMA (HCC): ICD-10-CM

## 2021-08-20 DIAGNOSIS — G89.18 POST-OP PAIN: Primary | ICD-10-CM

## 2021-08-20 LAB
ABO GROUP BLD: NORMAL
BLD GP AB SCN SERPL QL: NEGATIVE
ERYTHROCYTE [DISTWIDTH] IN BLOOD BY AUTOMATED COUNT: 12 % (ref 11.6–15.1)
HCT VFR BLD AUTO: 36.8 % (ref 34.8–46.1)
HGB BLD-MCNC: 12 G/DL (ref 11.5–15.4)
MCH RBC QN AUTO: 29.9 PG (ref 26.8–34.3)
MCHC RBC AUTO-ENTMCNC: 32.6 G/DL (ref 31.4–37.4)
MCV RBC AUTO: 92 FL (ref 82–98)
PLATELET # BLD AUTO: 184 THOUSANDS/UL (ref 149–390)
PMV BLD AUTO: 10.6 FL (ref 8.9–12.7)
RBC # BLD AUTO: 4.01 MILLION/UL (ref 3.81–5.12)
RH BLD: POSITIVE
SPECIMEN EXPIRATION DATE: NORMAL
WBC # BLD AUTO: 3.19 THOUSAND/UL (ref 4.31–10.16)

## 2021-08-20 PROCEDURE — 88344 IMHCHEM/IMCYTCHM EA MLT ANTB: CPT | Performed by: PATHOLOGY

## 2021-08-20 PROCEDURE — 46922 EXCISION OF ANAL LESION(S): CPT | Performed by: OBSTETRICS & GYNECOLOGY

## 2021-08-20 PROCEDURE — 88309 TISSUE EXAM BY PATHOLOGIST: CPT | Performed by: PATHOLOGY

## 2021-08-20 PROCEDURE — 86900 BLOOD TYPING SEROLOGIC ABO: CPT | Performed by: STUDENT IN AN ORGANIZED HEALTH CARE EDUCATION/TRAINING PROGRAM

## 2021-08-20 PROCEDURE — 56620 VULVECTOMY SIMPLE PARTIAL: CPT | Performed by: OBSTETRICS & GYNECOLOGY

## 2021-08-20 PROCEDURE — 85027 COMPLETE CBC AUTOMATED: CPT | Performed by: ANESTHESIOLOGY

## 2021-08-20 PROCEDURE — 86901 BLOOD TYPING SEROLOGIC RH(D): CPT | Performed by: STUDENT IN AN ORGANIZED HEALTH CARE EDUCATION/TRAINING PROGRAM

## 2021-08-20 PROCEDURE — 86850 RBC ANTIBODY SCREEN: CPT | Performed by: STUDENT IN AN ORGANIZED HEALTH CARE EDUCATION/TRAINING PROGRAM

## 2021-08-20 RX ORDER — ONDANSETRON 2 MG/ML
4 INJECTION INTRAMUSCULAR; INTRAVENOUS ONCE AS NEEDED
Status: DISCONTINUED | OUTPATIENT
Start: 2021-08-20 | End: 2021-08-20 | Stop reason: HOSPADM

## 2021-08-20 RX ORDER — PROPOFOL 10 MG/ML
INJECTION, EMULSION INTRAVENOUS AS NEEDED
Status: DISCONTINUED | OUTPATIENT
Start: 2021-08-20 | End: 2021-08-20

## 2021-08-20 RX ORDER — ONDANSETRON 2 MG/ML
INJECTION INTRAMUSCULAR; INTRAVENOUS AS NEEDED
Status: DISCONTINUED | OUTPATIENT
Start: 2021-08-20 | End: 2021-08-20

## 2021-08-20 RX ORDER — MAGNESIUM HYDROXIDE 1200 MG/15ML
LIQUID ORAL AS NEEDED
Status: DISCONTINUED | OUTPATIENT
Start: 2021-08-20 | End: 2021-08-20 | Stop reason: HOSPADM

## 2021-08-20 RX ORDER — GLYCOPYRROLATE 0.2 MG/ML
INJECTION INTRAMUSCULAR; INTRAVENOUS AS NEEDED
Status: DISCONTINUED | OUTPATIENT
Start: 2021-08-20 | End: 2021-08-20

## 2021-08-20 RX ORDER — LIDOCAINE HYDROCHLORIDE 10 MG/ML
0.5 INJECTION, SOLUTION EPIDURAL; INFILTRATION; INTRACAUDAL; PERINEURAL ONCE AS NEEDED
Status: COMPLETED | OUTPATIENT
Start: 2021-08-20 | End: 2021-08-20

## 2021-08-20 RX ORDER — KETOROLAC TROMETHAMINE 30 MG/ML
INJECTION, SOLUTION INTRAMUSCULAR; INTRAVENOUS AS NEEDED
Status: DISCONTINUED | OUTPATIENT
Start: 2021-08-20 | End: 2021-08-20

## 2021-08-20 RX ORDER — SODIUM CHLORIDE, SODIUM LACTATE, POTASSIUM CHLORIDE, CALCIUM CHLORIDE 600; 310; 30; 20 MG/100ML; MG/100ML; MG/100ML; MG/100ML
50 INJECTION, SOLUTION INTRAVENOUS CONTINUOUS
Status: DISCONTINUED | OUTPATIENT
Start: 2021-08-20 | End: 2021-08-20 | Stop reason: HOSPADM

## 2021-08-20 RX ORDER — IBUPROFEN 400 MG/1
600 TABLET ORAL EVERY 6 HOURS PRN
Status: DISCONTINUED | OUTPATIENT
Start: 2021-08-20 | End: 2021-08-20 | Stop reason: HOSPADM

## 2021-08-20 RX ORDER — LIDOCAINE HYDROCHLORIDE AND EPINEPHRINE 10; 10 MG/ML; UG/ML
INJECTION, SOLUTION INFILTRATION; PERINEURAL AS NEEDED
Status: DISCONTINUED | OUTPATIENT
Start: 2021-08-20 | End: 2021-08-20 | Stop reason: HOSPADM

## 2021-08-20 RX ORDER — OXYCODONE HYDROCHLORIDE 5 MG/1
5 TABLET ORAL EVERY 4 HOURS PRN
Qty: 15 TABLET | Refills: 0 | Status: SHIPPED | OUTPATIENT
Start: 2021-08-20 | End: 2021-08-30

## 2021-08-20 RX ORDER — MIDAZOLAM HYDROCHLORIDE 2 MG/2ML
INJECTION, SOLUTION INTRAMUSCULAR; INTRAVENOUS AS NEEDED
Status: DISCONTINUED | OUTPATIENT
Start: 2021-08-20 | End: 2021-08-20

## 2021-08-20 RX ORDER — FENTANYL CITRATE/PF 50 MCG/ML
25 SYRINGE (ML) INJECTION
Status: DISCONTINUED | OUTPATIENT
Start: 2021-08-20 | End: 2021-08-20 | Stop reason: HOSPADM

## 2021-08-20 RX ORDER — ACETAMINOPHEN 325 MG/1
650 TABLET ORAL EVERY 6 HOURS PRN
Status: DISCONTINUED | OUTPATIENT
Start: 2021-08-20 | End: 2021-08-20 | Stop reason: HOSPADM

## 2021-08-20 RX ORDER — LIDOCAINE HYDROCHLORIDE 10 MG/ML
INJECTION, SOLUTION EPIDURAL; INFILTRATION; INTRACAUDAL; PERINEURAL AS NEEDED
Status: DISCONTINUED | OUTPATIENT
Start: 2021-08-20 | End: 2021-08-20

## 2021-08-20 RX ORDER — DEXAMETHASONE SODIUM PHOSPHATE 10 MG/ML
INJECTION, SOLUTION INTRAMUSCULAR; INTRAVENOUS AS NEEDED
Status: DISCONTINUED | OUTPATIENT
Start: 2021-08-20 | End: 2021-08-20

## 2021-08-20 RX ADMIN — SODIUM CHLORIDE, SODIUM LACTATE, POTASSIUM CHLORIDE, AND CALCIUM CHLORIDE 50 ML/HR: .6; .31; .03; .02 INJECTION, SOLUTION INTRAVENOUS at 08:25

## 2021-08-20 RX ADMIN — LIDOCAINE HYDROCHLORIDE 50 MG: 10 INJECTION, SOLUTION EPIDURAL; INFILTRATION; INTRACAUDAL; PERINEURAL at 09:45

## 2021-08-20 RX ADMIN — Medication 25 MCG: at 10:52

## 2021-08-20 RX ADMIN — MIDAZOLAM 2 MG: 1 INJECTION INTRAMUSCULAR; INTRAVENOUS at 09:38

## 2021-08-20 RX ADMIN — LIDOCAINE HYDROCHLORIDE 0.2 ML: 10 INJECTION, SOLUTION EPIDURAL; INFILTRATION; INTRACAUDAL; PERINEURAL at 08:25

## 2021-08-20 RX ADMIN — PROPOFOL 100 MG: 10 INJECTION, EMULSION INTRAVENOUS at 09:45

## 2021-08-20 RX ADMIN — ONDANSETRON 4 MG: 2 INJECTION INTRAMUSCULAR; INTRAVENOUS at 09:57

## 2021-08-20 RX ADMIN — KETOROLAC TROMETHAMINE 100 MG: 30 INJECTION, SOLUTION INTRAMUSCULAR; INTRAVENOUS at 09:45

## 2021-08-20 RX ADMIN — GLYCOPYRROLATE 0.1 MG: 0.2 INJECTION, SOLUTION INTRAMUSCULAR; INTRAVENOUS at 09:57

## 2021-08-20 RX ADMIN — DEXAMETHASONE SODIUM PHOSPHATE 5 MG: 10 INJECTION, SOLUTION INTRAMUSCULAR; INTRAVENOUS at 09:45

## 2021-08-20 NOTE — ANESTHESIA POSTPROCEDURE EVALUATION
Post-Op Assessment Note    CV Status:  Stable    Pain management: adequate     Mental Status:  Alert and awake   Hydration Status:  Euvolemic   PONV Controlled:  Controlled   Airway Patency:  Patent      Post Op Vitals Reviewed: Yes      Staff: Anesthesiologist, with CRNAs         No complications documented      BP      Temp      Pulse     Resp      SpO2

## 2021-08-20 NOTE — H&P
H&P Exam - Gynecologic Oncology   CINDA CABELLO  [facility] 51 y o  female MRN: [id number]  Unit/Bed#: OR POOL Encounter: [id number]    Assessment/Plan     52yo with h/o HIV and new diagnosis of vulvar cancer presents for surgical intervention  Previously discussed risks vs benefits  Reviewed post op recovery/expectations  Pt udnerstands further surgical intervention may be necessary pending final pathology  History of Present Illness     HPI:  CINDA CABELLO  [facility] is a 51 y o  female who presents for surgical intervention  She has no complaints     Review of Systems   Constitutional: Negative for appetite change, chills, fatigue and fever  Respiratory: Negative for chest tightness and shortness of breath  Gastrointestinal: Negative for abdominal distention, abdominal pain, constipation, diarrhea and nausea  Genitourinary: Negative for difficulty urinating, flank pain, frequency, urgency, vaginal bleeding, vaginal discharge and vaginal pain  Musculoskeletal: Negative for back pain, joint swelling and myalgias  Skin: Negative for rash  Neurological: Negative for dizziness, light-headedness, numbness and headaches  Historical Information     Past Medical History:   Diagnosis Date    Anemia     Asthma     h/o asthma   no current issues, uses no inhalers    Cancer (HCC)     vulva    History of transfusion     HIV (human immunodeficiency virus infection) ([facility] [address] )      Past Surgical History:   Procedure Laterality Date    COLONOSCOPY      HYSTERECTOMY  2017    age 36    NH TOTAL ABDOM HYSTERECTOMY N/A 5/2/2016    Procedure: TOTAL ABDOMINAL HYSTERECTOMY  (RICK), REMOVAL OF BOTH TUBES, and biopsy rt   ovarian nodular;  Surgeon: Bradley Pierre MD;  Location: [facility];  Service: Gynecology       Family History   Problem Relation Age of Onset    Diabetes Mother     Diabetes Father     No Known Problems Sister     No Known Problems Brother     No Known Problems Son     No Known Problems Maternal Grandmother  No Known Problems Maternal Grandfather     No Known Problems Paternal Grandmother     No Known Problems Paternal Grandfather     No Known Problems Sister     No Known Problems Brother     No Known Problems Maternal Aunt     No Known Problems Maternal Aunt     No Known Problems Paternal Aunt     No Known Problems Paternal Aunt     No Known Problems Paternal Aunt      Social History   Social History     Substance and Sexual Activity   Alcohol Use No     Social History     Substance and Sexual Activity   Drug Use No     Social History     Tobacco Use   Smoking Status Never Smoker   Smokeless Tobacco Never Used       Meds/Allergies   all current active meds have been reviewed  No Known Allergies    Objective   Vitals: Height 5' 4" (1 626 m), weight 61 2 kg (135 lb), not currently breastfeeding  No intake or output data in the 24 hours ending 08/20/21 0757    Invasive Devices:        Physical Exam  HENT:      Head: Normocephalic and atraumatic  Nose: Nose normal    Cardiovascular:      Rate and Rhythm: Normal rate and regular rhythm  Pulmonary:      Effort: Pulmonary effort is normal    Abdominal:      General: There is no distension  Palpations: Abdomen is soft  There is no mass  Genitourinary:     Comments: defer  Musculoskeletal:         General: No swelling  Normal range of motion  Cervical back: Normal range of motion  Skin:     General: Skin is warm and dry  Neurological:      General: No focal deficit present  Mental Status: She is alert  Psychiatric:         Mood and Affect: Mood normal          Lab Results:   No visits with results within 1 Day(s) from this visit     Latest known visit with results is:   Orders Only on 08/10/2021   Component Date Value    SL AMB ANTIBODY SCREEN, * 08/10/2021 NO ANTIBODIES DETECTED     ABO Grouping 08/10/2021 O     Rh Type 08/10/2021 RH(D) POSITIVE

## 2021-08-20 NOTE — OP NOTE
OPERATIVE REPORT  PATIENT NAME: Patt Cheadle    :  1975  MRN: [id number]  Pt Location: [facility] OR ROOM 03    SURGERY DATE: 2021    Surgeon(s) and Role:     * Emmanuelle Storey MD - Primary     * Johana Arechiga MD - Assisting    Preop Diagnosis:  Vulvar carcinoma ([address] ) [C51 9]    Post-Op Diagnosis Codes:     * Vulvar carcinoma ([address] ) [C51 9]    Procedure(s) (LRB):  WIDE LOCAL VULVECTOMY (N/A)  EXCISION  BIOPSY LESION/ MASS  ANAL/RECTAL (N/A)    Specimen(s):  ID Type Source Tests Collected by Time Destination   1 : left posterior  Tissue Vulva TISSUE EXAM Emmanuelle Storey MD 2021 10:11 AM    2 : clitoris calabrese Tissue Vulva TISSUE EXAM Emmanuelle Storey MD 2021 10:13 AM    3 : 4:00 anus Tissue Anus TISSUE EXAM Emmanuelle Storey MD 2021 10:14 AM        Estimated Blood Loss:   Minimal    Drains:  * No LDAs found *    Anesthesia Type:   General    Operative Indications:  Vulvar carcinoma ([facility] [address] ) [C51 9]  39WR with hx of 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 and surrounding HSIL  She has a good understanding and has agreed to proceed with definitive surgical management    Operative Findings:  AWE changes over left posterior vulva,  excised completely  AWE on clitoral tip excisioned completely  AWE at 4 oclock anus approximately 0 5cm from anal verge excised completely  Complications:   None    Procedure and Technique:  The patient was taken to the operating room where she was given general anesthesia with LMA  She was prepped and draped in the usual sterile fashion in the dorso lithotomy position  Examination under anesthesia revealed findings  as noted above  Vinegar solution was applied to the vulva and faint acetowhite changes were notedas above  The clitoral tip had a 0 5cm lesion  The vulva was infiltrated with  1% lidocaine with 1;100,000 epinephrine  Posterior lesion completely excised in elliptical fashion using a scalpel and Bovie    Bleeding areas were cauterized with Bovie electrocautery  Clitoral tip was resected using bovie  Anal lesion was excised in elliptical fashion  Deep layers were reapproximated using 2-0 vicryl  Skin incisions were re-approximated using 3-0 Vicryl in interrupted fashion  The patient tolerated the procedure well and was taken to the recovery room in stable condition  All sponge, needle and instrument counts were correct x2  Anesthesia was reversed and the patient was taken to the PACU in a stable condition      I was present for the entire procedure    Patient Disposition:  PACU     SIGNATURE: Sarita Hurd MD  DATE: August 20, 2021  TIME: 10:36 AM

## 2021-08-20 NOTE — ANESTHESIA PREPROCEDURE EVALUATION
Procedure:  WIDE LOCAL VULVECTOMY (N/A Vulva)  EXCISION  BIOPSY LESION/ MASS  ANAL/RECTAL (N/A Anus)    Relevant Problems   HEMATOLOGY   (+) Anemia      NEURO/PSYCH   (+) History of HIV infection (HCC)        Physical Exam    Airway    Mallampati score: II  TM Distance: >3 FB  Neck ROM: full     Dental   No notable dental hx     Cardiovascular  Cardiovascular exam normal    Pulmonary  Pulmonary exam normal     Other Findings        Anesthesia Plan  ASA Score- 2     Anesthesia Type- general with ASA Monitors  Additional Monitors:   Airway Plan: LMA  Plan Factors-Exercise tolerance (METS): >4 METS  Chart reviewed  Existing labs reviewed  Patient summary reviewed  Patient is not a current smoker  Patient not instructed to abstain from smoking on day of procedure  Patient did not smoke on day of surgery  Induction- intravenous  Postoperative Plan- Plan for postoperative opioid use  Informed Consent- Anesthetic plan and risks discussed with patient

## 2021-08-20 NOTE — DISCHARGE INSTRUCTIONS
Vulva Biopsy   WHAT YOU NEED TO KNOW:   Itching, discomfort, and spotty bleeding are normal for the first days after your biopsy  If you have stitches, it may take up to 2 weeks for them to dissolve  It may take up to 3 weeks to get the results of your biopsy  DISCHARGE INSTRUCTIONS:   Go to the emergency department if: In the first 3 days after your biopsy:   · You have a fever of 102°F (38 8°C) or higher  · You have increased pain or bleeding  Call your doctor if:   · You have a fever of 100 4°F (38°C) or higher  · You have foul-smelling drainage from the area  · You have increased redness or warmth in the biopsy area  · You have questions or concerns about your condition or care  Medicines: You may need any of the following:  · Acetaminophen  decreases pain and fever  It is available without a doctor's order  Ask how much to take and how often to take it  Follow directions  Read the labels of all other medicines you are using to see if they also contain acetaminophen, or ask your doctor or pharmacist  Acetaminophen can cause liver damage if not taken correctly  Do not use more than 4 grams (4,000 milligrams) total of acetaminophen in one day  · NSAIDs , such as ibuprofen, help decrease swelling, pain, and fever  This medicine is available with or without a doctor's order  NSAIDs can cause stomach bleeding or kidney problems in certain people  If you take blood thinner medicine, always ask your healthcare provider if NSAIDs are safe for you  Always read the medicine label and follow directions  · Take your medicine as directed  Contact your healthcare provider if you think your medicine is not helping or if you have side effects  Tell him or her if you are allergic to any medicine  Keep a list of the medicines, vitamins, and herbs you take  Include the amounts, and when and why you take them  Bring the list or the pill bottles to follow-up visits   Carry your medicine list with you in case of an emergency  Care for the biopsy area:  Keep the area clean and dry  When you are able to shower, wash the area with soap and warm water  Pat the area dry  Do not  rub the area  Rubbing can cause bleeding and discomfort  You may be told to put a small amount of petroleum jelly on the biopsy area  It can prevent discomfort when you urinate  Do not have sex until the area is healed  Follow up with your healthcare provider as directed: You may need to return to get the results of your biopsy  You may also need more tests  Write down your questions so you remember to ask them during your visits  © Copyright [employer] 2021 Information is for End User's use only and may not be sold, redistributed or otherwise used for commercial purposes  All illustrations and images included in CareNotes® are the copyrighted property of [employer]  or [employer]  The above information is an  only  It is not intended as medical advice for individual conditions or treatments  Talk to your doctor, nurse or pharmacist before following any medical regimen to see if it is safe and effective for you

## 2021-08-22 ENCOUNTER — TELEPHONE (OUTPATIENT)
Dept: OTHER | Facility: OTHER | Age: 46
End: 2021-08-22

## 2021-08-22 NOTE — TELEPHONE ENCOUNTER
Message sent to the on call provider in regard to the patients issues with using the bathroom since her surgery on 8/20 and lower back pain

## 2021-08-23 ENCOUNTER — HOSPITAL ENCOUNTER (EMERGENCY)
Facility: HOSPITAL | Age: 46
Discharge: HOME | End: 2021-08-24
Attending: EMERGENCY MEDICINE
Payer: COMMERCIAL

## 2021-08-23 ENCOUNTER — APPOINTMENT (EMERGENCY)
Dept: RADIOLOGY | Facility: HOSPITAL | Age: 46
End: 2021-08-23
Payer: COMMERCIAL

## 2021-08-23 DIAGNOSIS — K59.00 CONSTIPATION: Primary | ICD-10-CM

## 2021-08-23 LAB
ANION GAP SERPL CALCULATED.3IONS-SCNC: 3 MMOL/L (ref 4–13)
BACTERIA UR QL AUTO: ABNORMAL /HPF
BASOPHILS # BLD AUTO: 0.02 THOUSANDS/ΜL (ref 0–0.1)
BASOPHILS NFR BLD AUTO: 0 % (ref 0–1)
BILIRUB UR QL STRIP: NEGATIVE
BUN SERPL-MCNC: 11 MG/DL (ref 5–25)
CALCIUM SERPL-MCNC: 9.3 MG/DL (ref 8.3–10.1)
CHLORIDE SERPL-SCNC: 109 MMOL/L (ref 100–108)
CLARITY UR: ABNORMAL
CO2 SERPL-SCNC: 28 MMOL/L (ref 21–32)
COLOR UR: YELLOW
CREAT SERPL-MCNC: 0.7 MG/DL (ref 0.6–1.3)
EOSINOPHIL # BLD AUTO: 0.03 THOUSAND/ΜL (ref 0–0.61)
EOSINOPHIL NFR BLD AUTO: 0 % (ref 0–6)
ERYTHROCYTE [DISTWIDTH] IN BLOOD BY AUTOMATED COUNT: 12.2 % (ref 11.6–15.1)
EXT PREG TEST URINE: NEGATIVE
EXT. CONTROL ED NAV: NORMAL
GFR SERPL CREATININE-BSD FRML MDRD: 119 ML/MIN/1.73SQ M
GLUCOSE SERPL-MCNC: 106 MG/DL (ref 65–140)
GLUCOSE UR STRIP-MCNC: NEGATIVE MG/DL
HCT VFR BLD AUTO: 37.8 % (ref 34.8–46.1)
HGB BLD-MCNC: 12.2 G/DL (ref 11.5–15.4)
HGB UR QL STRIP.AUTO: NEGATIVE
HYALINE CASTS #/AREA URNS LPF: ABNORMAL /LPF
IMM GRANULOCYTES # BLD AUTO: 0.03 THOUSAND/UL (ref 0–0.2)
IMM GRANULOCYTES NFR BLD AUTO: 0 % (ref 0–2)
KETONES UR STRIP-MCNC: NEGATIVE MG/DL
LEUKOCYTE ESTERASE UR QL STRIP: ABNORMAL
LYMPHOCYTES # BLD AUTO: 2.17 THOUSANDS/ΜL (ref 0.6–4.47)
LYMPHOCYTES NFR BLD AUTO: 29 % (ref 14–44)
MCH RBC QN AUTO: 29.6 PG (ref 26.8–34.3)
MCHC RBC AUTO-ENTMCNC: 32.3 G/DL (ref 31.4–37.4)
MCV RBC AUTO: 92 FL (ref 82–98)
MONOCYTES # BLD AUTO: 0.46 THOUSAND/ΜL (ref 0.17–1.22)
MONOCYTES NFR BLD AUTO: 6 % (ref 4–12)
NEUTROPHILS # BLD AUTO: 4.77 THOUSANDS/ΜL (ref 1.85–7.62)
NEUTS SEG NFR BLD AUTO: 65 % (ref 43–75)
NITRITE UR QL STRIP: NEGATIVE
NON-SQ EPI CELLS URNS QL MICRO: ABNORMAL /HPF
NRBC BLD AUTO-RTO: 0 /100 WBCS
PH UR STRIP.AUTO: 7.5 [PH]
PLATELET # BLD AUTO: 202 THOUSANDS/UL (ref 149–390)
PMV BLD AUTO: 10.5 FL (ref 8.9–12.7)
POTASSIUM SERPL-SCNC: 3.9 MMOL/L (ref 3.5–5.3)
PROT UR STRIP-MCNC: NEGATIVE MG/DL
RBC # BLD AUTO: 4.12 MILLION/UL (ref 3.81–5.12)
RBC #/AREA URNS AUTO: ABNORMAL /HPF
SODIUM SERPL-SCNC: 140 MMOL/L (ref 136–145)
SP GR UR STRIP.AUTO: 1.02 (ref 1–1.03)
UROBILINOGEN UR QL STRIP.AUTO: 1 E.U./DL
WBC # BLD AUTO: 7.48 THOUSAND/UL (ref 4.31–10.16)
WBC #/AREA URNS AUTO: ABNORMAL /HPF

## 2021-08-23 PROCEDURE — 80048 BASIC METABOLIC PNL TOTAL CA: CPT | Performed by: EMERGENCY MEDICINE

## 2021-08-23 PROCEDURE — 81001 URINALYSIS AUTO W/SCOPE: CPT | Performed by: EMERGENCY MEDICINE

## 2021-08-23 PROCEDURE — 85025 COMPLETE CBC W/AUTO DIFF WBC: CPT | Performed by: EMERGENCY MEDICINE

## 2021-08-23 PROCEDURE — 99284 EMERGENCY DEPT VISIT MOD MDM: CPT

## 2021-08-23 PROCEDURE — 74177 CT ABD & PELVIS W/CONTRAST: CPT

## 2021-08-23 PROCEDURE — 81025 URINE PREGNANCY TEST: CPT | Performed by: EMERGENCY MEDICINE

## 2021-08-23 PROCEDURE — 87086 URINE CULTURE/COLONY COUNT: CPT | Performed by: EMERGENCY MEDICINE

## 2021-08-23 PROCEDURE — 36415 COLL VENOUS BLD VENIPUNCTURE: CPT | Performed by: EMERGENCY MEDICINE

## 2021-08-23 PROCEDURE — 99285 EMERGENCY DEPT VISIT HI MDM: CPT | Performed by: EMERGENCY MEDICINE

## 2021-08-23 RX ADMIN — IOHEXOL 100 ML: 350 INJECTION, SOLUTION INTRAVENOUS at 23:56

## 2021-08-24 ENCOUNTER — TELEPHONE (OUTPATIENT)
Dept: HEMATOLOGY ONCOLOGY | Facility: CLINIC | Age: 46
End: 2021-08-24

## 2021-08-24 VITALS
WEIGHT: 135 LBS | BODY MASS INDEX: 23.17 KG/M2 | RESPIRATION RATE: 17 BRPM | SYSTOLIC BLOOD PRESSURE: 122 MMHG | TEMPERATURE: 99.3 F | DIASTOLIC BLOOD PRESSURE: 58 MMHG | HEART RATE: 56 BPM | OXYGEN SATURATION: 99 %

## 2021-08-24 LAB
BACTERIA UR QL AUTO: NORMAL /HPF
BILIRUB UR QL STRIP: NEGATIVE
CLARITY UR: CLEAR
COLOR UR: ABNORMAL
GLUCOSE UR STRIP-MCNC: NEGATIVE MG/DL
HGB UR QL STRIP.AUTO: ABNORMAL
HYALINE CASTS #/AREA URNS LPF: NORMAL /LPF
KETONES UR STRIP-MCNC: NEGATIVE MG/DL
LEUKOCYTE ESTERASE UR QL STRIP: NEGATIVE
NITRITE UR QL STRIP: NEGATIVE
NON-SQ EPI CELLS URNS QL MICRO: NORMAL /HPF
PH UR STRIP.AUTO: 7 [PH] (ref 4.5–8)
PROT UR STRIP-MCNC: NEGATIVE MG/DL
RBC #/AREA URNS AUTO: NORMAL /HPF
SP GR UR STRIP.AUTO: 1.02 (ref 1–1.03)
UROBILINOGEN UR QL STRIP.AUTO: 0.2 E.U./DL
WBC #/AREA URNS AUTO: NORMAL /HPF

## 2021-08-24 PROCEDURE — 81001 URINALYSIS AUTO W/SCOPE: CPT

## 2021-08-24 RX ORDER — SODIUM PHOSPHATE, DIBASIC AND SODIUM PHOSPHATE, MONOBASIC 7; 19 G/133ML; G/133ML
1 ENEMA RECTAL ONCE
Status: COMPLETED | OUTPATIENT
Start: 2021-08-24 | End: 2021-08-24

## 2021-08-24 RX ADMIN — SODIUM PHOSPHATE 1 ENEMA: 7; 19 ENEMA RECTAL at 03:19

## 2021-08-24 NOTE — ED PROVIDER NOTES
History  Chief Complaint   Patient presents with    Urinary Retention     pt had procedure on Friday and states she is having trouble urinating since  pt c/o lower abdominal pain and bloating with pressure  Pt is a 52yo F with past medical history of HIV who presents for constipation and difficulty urinating  Patient states that she had general anesthesia and vulvectomy for cancer on Friday and since then has been unable to have a bowel movement  Patient also states since then she has had difficulty urinating  Patient states she did have a bowel movement the morning before the surgery, but has not had any since  Patient states she feels as though she needs to go but cannot  Patient having some lower back and rectal discomfort because of this  In regards urinary retention, patient reports that she has had difficulty urinating since this time  Patient states she has also had decreased urine volume  Patient has sensation of incomplete voiding  She denies any frequency, urgency, or dysuria  Patient also reporting generalized abdominal distention and discomfort during this time  Patient states that she has been taking opiates for post surgical pain control  Patient denies taking any bowel regimen  Patient has no other symptoms at this time  Prior to Admission Medications   Prescriptions Last Dose Informant Patient Reported? Taking? Biktarvy -25 MG tablet   No No   Sig: Take 1 tablet by mouth daily with breakfast   Multiple Vitamins-Minerals (MULTIVITAMIN ADULT) TABS  Self Yes No   Sig: Take 1 tablet by mouth daily   oxyCODONE (ROXICODONE) 5 mg immediate release tablet   No No   Sig: Take 1 tablet (5 mg total) by mouth every 4 (four) hours as needed for moderate pain for up to 10 daysMax Daily Amount: 30 mg      Facility-Administered Medications: None       Past Medical History:   Diagnosis Date    Anemia     Asthma     h/o asthma    no current issues, uses no inhalers    Cancer (HCC) vulva    History of transfusion     HIV (human immunodeficiency virus infection) ([facility] [address] )        Past Surgical History:   Procedure Laterality Date    COLONOSCOPY      HYSTERECTOMY  2017    age 36    MI PART SIMPLE [address] N/A 8/20/2021    Procedure: WIDE LOCAL VULVECTOMY;  Surgeon: Anahi Michael MD;  [facility];  Service: Gynecology Oncology    MI TOTAL ABDOM HYSTERECTOMY N/A 5/2/2016    Procedure: TOTAL ABDOMINAL HYSTERECTOMY  (RICK), REMOVAL OF BOTH TUBES, and biopsy rt  ovarian nodular;  Surgeon: Chantelle Menjivar MD;  [facility];  Service: Gynecology    RECTAL BIOPSY N/A 8/20/2021    Procedure: EXCISION  BIOPSY LESION/ MASS  ANAL/RECTAL;  Surgeon: Anahi Michael MD;  [facility];  Service: Gynecology Oncology       Family History   Problem Relation Age of Onset    Diabetes Mother     Diabetes Father     No Known Problems Sister     No Known Problems Brother     No Known Problems Son     No Known Problems Maternal Grandmother     No Known Problems Maternal Grandfather     No Known Problems Paternal Grandmother     No Known Problems Paternal Grandfather     No Known Problems Sister     No Known Problems Brother     No Known Problems Maternal Aunt     No Known Problems Maternal Aunt     No Known Problems Paternal Aunt     No Known Problems Paternal Aunt     No Known Problems Paternal Aunt      I have reviewed and agree with the history as documented  E-Cigarette/Vaping    E-Cigarette Use Never User      E-Cigarette/Vaping Substances    Nicotine No     THC No     CBD No     Flavoring No     Other No     Unknown No      Social History     Tobacco Use    Smoking status: Never Smoker    Smokeless tobacco: Never Used   Vaping Use    Vaping Use: Never used   Substance Use Topics    Alcohol use: No    Drug use: No        Review of Systems   Constitutional: Negative for appetite change, chills and fever  HENT: Negative for sore throat      Eyes: Negative for pain  Respiratory: Negative for cough, chest tightness and shortness of breath  Cardiovascular: Negative for chest pain and leg swelling  Gastrointestinal: Positive for abdominal distention, abdominal pain and constipation  Negative for anal bleeding, blood in stool, diarrhea, nausea and vomiting  Genitourinary: Positive for decreased urine volume and difficulty urinating  Negative for dysuria, frequency and urgency  Musculoskeletal: Negative for arthralgias, back pain, gait problem and neck pain  Skin: Negative for color change, pallor and rash  Neurological: Negative for weakness and headaches  Psychiatric/Behavioral: Negative for agitation, behavioral problems and confusion  Physical Exam  ED Triage Vitals   Temperature Pulse Respirations Blood Pressure SpO2   08/23/21 1939 08/23/21 1938 08/23/21 1938 08/23/21 1938 08/23/21 1938   99 3 °F (37 4 °C) 64 16 120/82 99 %      Temp src Heart Rate Source Patient Position - Orthostatic VS BP Location FiO2 (%)   -- 08/24/21 0430 08/24/21 0430 08/24/21 0430 --    Monitor Lying Left arm       Pain Score       08/24/21 0430       4             Orthostatic Vital Signs  Vitals:    08/23/21 1938 08/24/21 0430   BP: 120/82 122/58   Pulse: 64 56   Patient Position - Orthostatic VS:  Lying       Physical Exam  Vitals reviewed  Constitutional:       Appearance: She is well-developed  She is not toxic-appearing or diaphoretic  Comments: Uncomfortable appearing  HENT:      Head: Normocephalic and atraumatic  Right Ear: External ear normal       Left Ear: External ear normal       Nose: Nose normal       Mouth/Throat:      Mouth: Mucous membranes are moist       Pharynx: Oropharynx is clear  Eyes:      Extraocular Movements: Extraocular movements intact  Pupils: Pupils are equal, round, and reactive to light  Cardiovascular:      Rate and Rhythm: Normal rate and regular rhythm  Heart sounds: Normal heart sounds  No murmur heard  Pulmonary:      Effort: Pulmonary effort is normal  No respiratory distress  Breath sounds: Normal breath sounds  Abdominal:      General: There is distension  Palpations: Abdomen is soft  There is no mass  Tenderness: There is abdominal tenderness (diffuse)  There is no guarding or rebound  Musculoskeletal:         General: Normal range of motion  Cervical back: Normal range of motion  Right lower leg: No edema  Left lower leg: No edema  Skin:     General: Skin is warm and dry  Capillary Refill: Capillary refill takes less than 2 seconds  Coloration: Skin is not pale  Findings: No erythema or rash  Neurological:      Mental Status: She is alert and oriented to person, place, and time  Psychiatric:         Speech: Speech normal          Behavior: Behavior is cooperative  ED Medications  Medications   iohexol (OMNIPAQUE) 350 MG/ML injection (SINGLE-DOSE) 100 mL (100 mL Intravenous Given 8/23/21 2356)   sodium phosphate-biphosphate (FLEET) enema 1 enema (1 enema Rectal Given 8/24/21 0319)       Diagnostic Studies  Results Reviewed     Procedure Component Value Units Date/Time    Urine Microscopic [[id number]] Collected: 08/24/21 0330    Lab Status:  In process Specimen: Urine, Clean Catch Updated: 08/24/21 0340    Urine Macroscopic, POC [[id number]]  (Abnormal) Collected: 08/24/21 0330    Lab Status: Final result Specimen: Urine Updated: 08/24/21 0332     Color, UA Karolina     Clarity, UA Clear     pH, UA 7 0     Leukocytes, UA Negative     Nitrite, UA Negative     Protein, UA Negative mg/dl      Glucose, UA Negative mg/dl      Ketones, UA Negative mg/dl      Urobilinogen, UA 0 2 E U /dl      Bilirubin, UA Negative     Blood, UA Trace     Specific [place], UA 1 020    Narrative:      CLINITEK RESULT    Basic metabolic panel [[id number]]  (Abnormal) Collected: 08/23/21 2131    Lab Status: Final result Specimen: Blood from Arm, Left Updated: 08/23/21 2155 Sodium 140 mmol/L      Potassium 3 9 mmol/L      Chloride 109 mmol/L      CO2 28 mmol/L      ANION GAP 3 mmol/L      BUN 11 mg/dL      Creatinine 0 70 mg/dL      Glucose 106 mg/dL      Calcium 9 3 mg/dL      eGFR 119 ml/min/1 73sq m     Narrative:      [place] guidelines for Chronic Kidney Disease (CKD):     Stage 1 with normal or high GFR (GFR > 90 mL/min/1 73 square meters)    Stage 2 Mild CKD (GFR = 60-89 mL/min/1 73 square meters)    Stage 3A Moderate CKD (GFR = 45-59 mL/min/1 73 square meters)    Stage 3B Moderate CKD (GFR = 30-44 mL/min/1 73 square meters)    Stage 4 Severe CKD (GFR = 15-29 mL/min/1 73 square meters)    Stage 5 End Stage CKD (GFR <15 mL/min/1 73 square meters)  Note: GFR calculation is accurate only with a steady state creatinine    CBC and differential [[id number]] Collected: 08/23/21 2131    Lab Status: Final result Specimen: Blood from Arm, Left Updated: 08/23/21 2150     WBC 7 48 Thousand/uL      RBC 4 12 Million/uL      Hemoglobin 12 2 g/dL      Hematocrit 37 8 %      MCV 92 fL      MCH 29 6 pg      MCHC 32 3 g/dL      RDW 12 2 %      MPV 10 5 fL      Platelets 159 Thousands/uL      nRBC 0 /100 WBCs      Neutrophils Relative 65 %      Immat GRANS % 0 %      Lymphocytes Relative 29 %      Monocytes Relative 6 %      Eosinophils Relative 0 %      Basophils Relative 0 %      Neutrophils Absolute 4 77 Thousands/µL      Immature Grans Absolute 0 03 Thousand/uL      Lymphocytes Absolute 2 17 Thousands/µL      Monocytes Absolute 0 46 Thousand/µL      Eosinophils Absolute 0 03 Thousand/µL      Basophils Absolute 0 02 Thousands/µL     Urine Microscopic [[id number]]  (Abnormal) Collected: 08/23/21 2131    Lab Status: Final result Specimen: Urine, Other Updated: 08/23/21 2149     RBC, UA None Seen /hpf      WBC, UA 20-30 /hpf      Epithelial Cells Moderate /hpf      Bacteria, UA Occasional /hpf      Hyaline Casts, UA 3-5 /lpf     Urine culture [[id number]] Collected: 08/23/21 2131    Lab Status: In process Specimen: Urine, Other Updated: 08/23/21 2149    UA w Reflex to Microscopic w Reflex to Culture [[id number]]  (Abnormal) Collected: 08/23/21 2131    Lab Status: Final result Specimen: Urine, Other Updated: 08/23/21 2145     Color, UA Yellow     Clarity, UA Cloudy     Specific [place], UA 1 017     pH, UA 7 5     Leukocytes, UA Moderate     Nitrite, UA Negative     Protein, UA Negative mg/dl      Glucose, UA Negative mg/dl      Ketones, UA Negative mg/dl      Urobilinogen, UA 1 0 E U /dl      Bilirubin, UA Negative     Blood, UA Negative    POCT pregnancy, urine [[id number]]  (Normal) Resulted: 08/23/21 2144    Lab Status: Final result Specimen: Urine Updated: 08/23/21 2144     EXT PREG TEST UR (Ref: Negative) negative     Control valid                 CT abdomen pelvis with contrast   Final Result by Linda Buchanan DO (08/24 0206)      Partially distended bladder  Small amount of air within the bladder lumen  Mild circumferential bladder wall thickening noted  A catheter is seen within the bladder  Consider a cystitis in the appropriate clinical setting  Correlation with the    patient's symptoms, laboratory values, and urinalysis recommended  There is impacted stool in the rectum  The rectum measures approximately 7 0 x 6 4 cm in size  There is some presacral and perirectal fat stranding suggesting a component of stercoral colitis  Impacted stool in the rectum may also be contributing to    the patient's urinary retention secondary to proximal urethral compression  No evidence of bowel obstruction  Other findings as above  The study was marked in [facility] for immediate notification  Workstation performed: [id number]               Procedures  Procedures      ED Course  ED Course as of Aug 24 0457   Mon Aug 23, 2021   2145 Preg negative  POCT pregnancy, urine   2154 Bladder scan >850  Will place Stanley for decompression         2155 Reviewed and without actionable derangement  CBC and differential   2155 Leuks w/o nitrites   Leukocytes, UA(!): Moderate   2155 WBC and occasional bacteria but contaminated sample  Epithelial Cells(!): Moderate   2156 No evidence of kidney injury  Creatinine: 0 70   2156 Reviewed and without actionable derangement  Basic metabolic panel(!)   4045 Awaiting CT  Tue Aug 24, 2021   0031 Wet read shows significant stool burden  Will await official read prior to ordering enema  CT abdomen pelvis with contrast   1144 Awaiting official read  0208 Large stool burden with impaction in the rectum stercoral colitis  Will plan for enema  CT abdomen pelvis with contrast   0239 Enema in process  0319 Soap suds enema able to relieve some liquid stool but not significant amount  Fleet enema ordered  3402 Repeat UA from Stanley due to initial sample likely dirty  No evidence of infection  Urine Macroscopic, POC(!)   0342 Pt had large bowel movement streaked with blood  Pt concern her stitches no longer intact  Visual inspection of anus shows some blood without active bleeding and intact sutures  Will remove Stanley as obstruction should be relieved  SBIRT 22yo+      Most Recent Value   SBIRT (24 yo +)   In order to provide better care to our patients, we are screening all of our patients for alcohol and drug use  Would it be okay to ask you these screening questions? No Filed at: 08/23/2021 2144                MDM  Number of Diagnoses or Management Options  Constipation  Diagnosis management comments: Pt is a 52yo F who presents with constipation and urinary retention  Exam pertinent for distended abdomen with diffuse tenderness  Differential diagnosis to include but not limited to constipation, adhesions, infection  Will get basic labs and urinalysis to rule out infection and ensure kidney function appropriate for CT scan    Will also get CT scan based on distension and diffuse abdominal tenderness  Postvoid residual obtained and was greater than 800 mLs  Stanley placed at this time for decompression  Patient reports some improvement of symptoms following Stanley placement  Repeat UA obtained from Stanley as initial had epithelial cells and was likely dirty  Repeat UA from Stanley shows no evidence of infection  CT scan shows significant stool burden and impaction in the rectum without perforation or infection  Will plan to treat with enemas to resolve impaction  Soap suds enema initially attempted and had small amount of liquid stool  Fleet enema ordered and patient had a large bowel movement following this  Patient reports bowel movement was streaked with blood and she is concerned about her sutures from her recent procedure  Under direct visualization, no evidence of active bleeding and sutures intact  Stanley removed at this time and patient given fluids  Patient then able to void freely  Will plan for discharge with follow-up to gynecology  Patient agreeable to plan and reports significant improvement of symptoms  Plan to discharge patient with follow-up to gynecology  Discussed returning the ED with significant worsening of symptoms  Discussed use of over the counter medications as stated on the bottle as needed for pain  Discussed taking MiraLax as needed while on opioid medications to avoid further constipation  Pt expressed understanding of discharge instructions, return precautions, and medication instructions  All questions were answered and pt was discharged without incident              Amount and/or Complexity of Data Reviewed  Clinical lab tests: ordered and reviewed  Tests in the radiology section of CPT®: ordered and reviewed  Discussion of test results with the performing providers: yes        Disposition  Final diagnoses:   Constipation     Time reflects when diagnosis was documented in both MDM as applicable and the Disposition within this note     Time User Action Codes Description Comment    8/24/2021  3:44 AM Ilda Chappell Add [K59 00] Constipation       ED Disposition     ED Disposition Condition Date/Time Comment    Discharge Stable Tue Aug 24, 2021  3:44 AM Elisha Mckeon discharge to home/self care  Follow-up Information     Follow up With Specialties Details Why Contact Info    Kimberlyn Villa MD Internal Medicine Call  As needed [address] [facility]  [address] [place]  [phone]            Discharge Medication List as of 8/24/2021  4:24 AM      CONTINUE these medications which have NOT CHANGED    Details   Biktarvy -25 MG tablet Take 1 tablet by mouth daily with breakfast, Starting Mon 8/9/2021, Until Sat 2/5/2022, Normal      Multiple Vitamins-Minerals (MULTIVITAMIN ADULT) TABS Take 1 tablet by mouth daily, Historical Med      oxyCODONE (ROXICODONE) 5 mg immediate release tablet Take 1 tablet (5 mg total) by mouth every 4 (four) hours as needed for moderate pain for up to 10 daysMax Daily Amount: 30 mg, Starting Fri 8/20/2021, Until Mon 8/30/2021 at 2359, Normal           No discharge procedures on file  PDMP Review       Value Time User    PDMP Reviewed  Yes 8/20/2021 10:34 AM Kassie Krishnamurthy MD           ED Provider  Attending physically available and evaluated Elisha Linda  I managed the patient along with the ED Attending      Electronically Signed by         Louanna Saint, MD  08/24/21 5380

## 2021-08-24 NOTE — DISCHARGE INSTRUCTIONS
Follow-up with gyn for further care  Call tomorrow and inform them you were seen in the ED  Keep your upcoming appointment  Take Miralax as needed while on opioid pain medications to avoid further constipation  Return to the ED for new or worsening symptoms

## 2021-08-26 LAB — BACTERIA UR CULT: NORMAL

## 2021-08-29 NOTE — ED ATTENDING ATTESTATION
8/23/2021  IDiane MD, saw and evaluated the patient  I have discussed the patient with the resident/non-physician practitioner and agree with the resident's/non-physician practitioner's findings, Plan of Care, and MDM as documented in the resident's/non-physician practitioner's note, except where noted  All available labs and Radiology studies were reviewed  I was present for key portions of any procedure(s) performed by the resident/non-physician practitioner and I was immediately available to provide assistance  At this point I agree with the current assessment done in the Emergency Department  I have conducted an independent evaluation of this patient a history and physical is as follows:    ED Course     Patient presents for evaluation secondary to constipation and difficulty urinating  Patient underwent a vulvectomy on Friday and a bowel movement since  Patient reports having decreased urination and incomplete voiding  No additional complaints  A/P:  Constipation, urinary retention  Will check labs, urine, and will CT abdomen and pelvis to evaluate for any obstructive cause of constipation      Critical Care Time  Procedures

## 2021-09-01 ENCOUNTER — OFFICE VISIT (OUTPATIENT)
Dept: GYNECOLOGIC ONCOLOGY | Facility: CLINIC | Age: 46
End: 2021-09-01

## 2021-09-01 VITALS
RESPIRATION RATE: 17 BRPM | HEIGHT: 64 IN | BODY MASS INDEX: 23.73 KG/M2 | DIASTOLIC BLOOD PRESSURE: 64 MMHG | WEIGHT: 139 LBS | OXYGEN SATURATION: 98 % | SYSTOLIC BLOOD PRESSURE: 110 MMHG | TEMPERATURE: 97.7 F | HEART RATE: 60 BPM

## 2021-09-01 DIAGNOSIS — C51.9 VULVAR CARCINOMA (HCC): Primary | ICD-10-CM

## 2021-09-01 DIAGNOSIS — K62.82 ANAL DYSPLASIA: ICD-10-CM

## 2021-09-01 PROCEDURE — 99024 POSTOP FOLLOW-UP VISIT: CPT | Performed by: OBSTETRICS & GYNECOLOGY

## 2021-09-01 NOTE — PROGRESS NOTES
Assessment/Plan:    Problem List Items Addressed This Visit        Digestive    Anal dysplasia     D/w pt positive margins  Fortunately the negative margin is the at the anal verge edge (9oclock)  Will refer to colorectal for discussion regarding re-excision vs observation  No evidence of lesions present currently          Relevant Orders    Ambulatory referral to Colorectal Surgery       Genitourinary    V[facility] carcinoma ([facility] ) - Primary     52yo with h/o HIV and stage 1a SCC of the vulvar presents s/p wide local resection  D/w pt no further evidence of invasive carcinoma but rather extensive VIN3  There was a positive margin however on exam today there are no visible lesions  D/w pt need for continued closed surveillance given her increased risk of recurrence/progression of VIN3 in the setting of HIV/immunocompromised state  Will RTC in 1 month for re-examination when further healed for residual lesions  CHIEF COMPLAINT: post op and treatment discussion       Problem:  Cancer Staging  No matching staging information was found for the patient  Previous therapy:  Oncology History   Vulvar carcinoma ([facility] )   7/2/2021 Biopsy    Invasive SCC of the vulva     7/12/2021 Initial Diagnosis    Vulvar carcinoma ([facility] )     8/20/2021 Surgery    Wide local vulvectomy  Fior-anal resection           Patient ID: Ronen Thomas is a 51 y o  female  52yo with h/o HIV and stage 1a SCC of the vulvar presents s/p wide local resection  Pt with significant constipation following surgery  Started senna without relief and was seen in the ED which required enemas  Reports getting better however has discomfort at the area of excision at the anus  Denies vaginal or rectal bleeding  Not using pain medication             The following portions of the patient's history were reviewed and updated as appropriate: allergies, current medications, past family history, past medical history, past social history, past surgical history and problem list     Review of Systems   Constitutional: Negative for appetite change, chills, fatigue and fever  Respiratory: Negative for chest tightness and shortness of breath  Gastrointestinal: Positive for constipation and rectal pain  Negative for abdominal distention, abdominal pain, diarrhea and nausea  Genitourinary: Positive for vaginal pain  Negative for difficulty urinating, flank pain, frequency, urgency, vaginal bleeding and vaginal discharge  Musculoskeletal: Negative for back pain, joint swelling and myalgias  Skin: Negative for rash  Neurological: Negative for dizziness, light-headedness, numbness and headaches  Current Outpatient Medications   Medication Sig Dispense Refill    Biktarvy -25 MG tablet Take 1 tablet by mouth daily with breakfast 90 tablet 1    Multiple Vitamins-Minerals (MULTIVITAMIN ADULT) TABS Take 1 tablet by mouth daily       No current facility-administered medications for this visit  Objective:    Blood pressure 110/64, pulse 60, temperature 97 7 °F (36 5 °C), temperature source Temporal, resp  rate 17, height 5' 4" (1 626 m), weight 63 kg (139 lb), SpO2 98 %, not currently breastfeeding  Body mass index is 23 86 kg/m²  Body surface area is 1 68 meters squared  Physical Exam  HENT:      Head: Normocephalic and atraumatic  Cardiovascular:      Rate and Rhythm: Normal rate and regular rhythm  Pulmonary:      Effort: Pulmonary effort is normal    Abdominal:      General: There is no distension  Palpations: Abdomen is soft  There is no mass  Genitourinary:     Comments: The external female genitalia is well healing  Perineal incision is opened with healthy tissue edges noted  Lesion at anus is also slightly opened with some granulation tissue present  No erythema or purulence noted  Musculoskeletal:         General: Normal range of motion  Cervical back: Normal range of motion     Skin:     General: Skin is warm and dry  Neurological:      Mental Status: She is alert and oriented to person, place, and time             Lab Results   Component Value Date     01/09/2015    K 3 9 08/23/2021     (H) 08/23/2021    CO2 28 08/23/2021    ANIONGAP 1 (L) 01/09/2015    BUN 11 08/23/2021    CREATININE 0 70 08/23/2021    GLUCOSE 100 01/09/2015    CALCIUM 9 3 08/23/2021    EGFR 119 08/23/2021     Lab Results   Component Value Date    WBC 7 48 08/23/2021    HGB 12 2 08/23/2021    HCT 37 8 08/23/2021    MCV 92 08/23/2021     08/23/2021     Lab Results   Component Value Date    NEUTROABS 4 77 08/23/2021        Trend:  No results found for:

## 2021-09-01 NOTE — ASSESSMENT & PLAN NOTE
D/w pt positive margins  Fortunately the negative margin is the at the anal verge edge (9oclock)    Will refer to colorectal for discussion regarding re-excision vs observation  No evidence of lesions present currently

## 2021-09-01 NOTE — ASSESSMENT & PLAN NOTE
54yo with h/o HIV and stage 1a SCC of the vulvar presents s/p wide local resection  D/w pt no further evidence of invasive carcinoma but rather extensive VIN3  There was a positive margin however on exam today there are no visible lesions  D/w pt need for continued closed surveillance given her increased risk of recurrence/progression of VIN3 in the setting of HIV/immunocompromised state  Will RTC in 1 month for re-examination when further healed for residual lesions

## 2021-09-07 ENCOUNTER — TELEPHONE (OUTPATIENT)
Dept: SURGICAL ONCOLOGY | Facility: CLINIC | Age: 46
End: 2021-09-07

## 2021-09-07 NOTE — TELEPHONE ENCOUNTER
Pt calling in regards to [facility] paper work  Facility states the have not received it yet  She would like both the original and [facility] extension faxed to # [phone]  She also mentioned separate paper work for her employer as well  Please give her a call to with any questions

## 2021-09-09 ENCOUNTER — TELEPHONE (OUTPATIENT)
Dept: GYNECOLOGIC ONCOLOGY | Facility: CLINIC | Age: 46
End: 2021-09-09

## 2021-09-09 NOTE — TELEPHONE ENCOUNTER
I called and spoke to patient  Per Dr Ammon Canela last office note patient isn't completely healed  She has a follow up appt 10/6/21 and is planning on returning to work on 10/20/2021  I will update FMLA and AFLAC papers to return to work on 10/20/2021

## 2021-09-27 ENCOUNTER — TELEPHONE (OUTPATIENT)
Dept: GYNECOLOGIC ONCOLOGY | Facility: CLINIC | Age: 46
End: 2021-09-27

## 2021-09-27 NOTE — TELEPHONE ENCOUNTER
Disability ppwk was faxed into the office  Ppwk was emailed to self and then forwarded to Roly CABELLO

## 2021-10-01 ENCOUNTER — TELEPHONE (OUTPATIENT)
Dept: GYNECOLOGIC ONCOLOGY | Facility: CLINIC | Age: 46
End: 2021-10-01

## 2021-10-04 ENCOUNTER — TELEPHONE (OUTPATIENT)
Dept: HEMATOLOGY ONCOLOGY | Facility: CLINIC | Age: 46
End: 2021-10-04

## 2021-10-04 ENCOUNTER — TELEPHONE (OUTPATIENT)
Dept: GYNECOLOGIC ONCOLOGY | Facility: CLINIC | Age: 46
End: 2021-10-04

## 2021-10-08 ENCOUNTER — HOSPITAL ENCOUNTER (OUTPATIENT)
Facility: AMBULARY SURGERY CENTER | Age: 46
Setting detail: OUTPATIENT SURGERY
End: 2021-10-08
Attending: COLON & RECTAL SURGERY | Admitting: COLON & RECTAL SURGERY
Payer: COMMERCIAL

## 2021-10-12 ENCOUNTER — OFFICE VISIT (OUTPATIENT)
Dept: GYNECOLOGIC ONCOLOGY | Facility: CLINIC | Age: 46
End: 2021-10-12
Payer: COMMERCIAL

## 2021-10-12 VITALS
DIASTOLIC BLOOD PRESSURE: 62 MMHG | HEART RATE: 63 BPM | TEMPERATURE: 97.7 F | WEIGHT: 141 LBS | SYSTOLIC BLOOD PRESSURE: 90 MMHG | HEIGHT: 64 IN | RESPIRATION RATE: 16 BRPM | BODY MASS INDEX: 24.07 KG/M2

## 2021-10-12 DIAGNOSIS — Z85.44 ENCOUNTER FOR FOLLOW-UP SURVEILLANCE OF VULVAR CANCER: Primary | ICD-10-CM

## 2021-10-12 DIAGNOSIS — K62.82 ANAL DYSPLASIA: ICD-10-CM

## 2021-10-12 DIAGNOSIS — Z08 ENCOUNTER FOR FOLLOW-UP SURVEILLANCE OF VULVAR CANCER: Primary | ICD-10-CM

## 2021-10-12 PROCEDURE — 88305 TISSUE EXAM BY PATHOLOGIST: CPT | Performed by: PATHOLOGY

## 2021-10-12 PROCEDURE — 99024 POSTOP FOLLOW-UP VISIT: CPT | Performed by: OBSTETRICS & GYNECOLOGY

## 2021-10-12 PROCEDURE — 56605 BIOPSY OF VULVA/PERINEUM: CPT | Performed by: OBSTETRICS & GYNECOLOGY

## 2021-10-25 PROBLEM — Z98.890 POST-OPERATIVE STATE: Status: ACTIVE | Noted: 2021-10-25

## 2021-10-27 ENCOUNTER — OFFICE VISIT (OUTPATIENT)
Dept: GYNECOLOGIC ONCOLOGY | Facility: CLINIC | Age: 46
End: 2021-10-27

## 2021-10-27 VITALS
HEIGHT: 64 IN | HEART RATE: 59 BPM | RESPIRATION RATE: 16 BRPM | WEIGHT: 139 LBS | BODY MASS INDEX: 23.73 KG/M2 | TEMPERATURE: 96.6 F | SYSTOLIC BLOOD PRESSURE: 98 MMHG | DIASTOLIC BLOOD PRESSURE: 68 MMHG

## 2021-10-27 DIAGNOSIS — Z98.890 POST-OPERATIVE STATE: Primary | ICD-10-CM

## 2021-10-27 PROCEDURE — 99024 POSTOP FOLLOW-UP VISIT: CPT | Performed by: OBSTETRICS & GYNECOLOGY

## 2021-11-01 VITALS — HEIGHT: 64 IN | BODY MASS INDEX: 23.73 KG/M2 | WEIGHT: 139 LBS

## 2021-11-05 ENCOUNTER — HOSPITAL ENCOUNTER (OUTPATIENT)
Facility: AMBULARY SURGERY CENTER | Age: 46
Setting detail: OUTPATIENT SURGERY
End: 2021-11-05
Attending: COLON & RECTAL SURGERY | Admitting: COLON & RECTAL SURGERY
Payer: COMMERCIAL

## 2021-11-05 RX ORDER — FENTANYL CITRATE/PF 50 MCG/ML
25 SYRINGE (ML) INJECTION
Status: CANCELLED | OUTPATIENT
Start: 2021-11-05

## 2021-11-05 RX ORDER — ONDANSETRON 2 MG/ML
4 INJECTION INTRAMUSCULAR; INTRAVENOUS ONCE AS NEEDED
Status: CANCELLED | OUTPATIENT
Start: 2021-11-05

## 2021-11-05 RX ORDER — HYDROMORPHONE HCL/PF 1 MG/ML
0.5 SYRINGE (ML) INJECTION
Status: CANCELLED | OUTPATIENT
Start: 2021-11-05

## 2021-11-09 ENCOUNTER — OFFICE VISIT (OUTPATIENT)
Dept: GYNECOLOGIC ONCOLOGY | Facility: CLINIC | Age: 46
End: 2021-11-09

## 2021-11-09 VITALS
SYSTOLIC BLOOD PRESSURE: 90 MMHG | RESPIRATION RATE: 16 BRPM | BODY MASS INDEX: 23.22 KG/M2 | HEIGHT: 64 IN | DIASTOLIC BLOOD PRESSURE: 74 MMHG | WEIGHT: 136 LBS | HEART RATE: 67 BPM | TEMPERATURE: 97.5 F

## 2021-11-09 DIAGNOSIS — Z98.890 POST-OPERATIVE STATE: Primary | ICD-10-CM

## 2021-11-09 PROCEDURE — 99024 POSTOP FOLLOW-UP VISIT: CPT | Performed by: OBSTETRICS & GYNECOLOGY

## 2021-11-24 ENCOUNTER — OFFICE VISIT (OUTPATIENT)
Dept: GYNECOLOGIC ONCOLOGY | Facility: CLINIC | Age: 46
End: 2021-11-24

## 2021-11-24 VITALS
HEART RATE: 94 BPM | DIASTOLIC BLOOD PRESSURE: 60 MMHG | WEIGHT: 141 LBS | HEIGHT: 64 IN | SYSTOLIC BLOOD PRESSURE: 112 MMHG | OXYGEN SATURATION: 99 % | TEMPERATURE: 97 F | RESPIRATION RATE: 16 BRPM | BODY MASS INDEX: 24.07 KG/M2

## 2021-11-24 DIAGNOSIS — Z98.890 POST-OPERATIVE STATE: Primary | ICD-10-CM

## 2021-11-24 PROCEDURE — 99024 POSTOP FOLLOW-UP VISIT: CPT | Performed by: OBSTETRICS & GYNECOLOGY

## 2021-12-27 ENCOUNTER — NURSE TRIAGE (OUTPATIENT)
Dept: OTHER | Facility: OTHER | Age: 46
End: 2021-12-27

## 2021-12-27 DIAGNOSIS — Z20.828 SARS-ASSOCIATED CORONAVIRUS EXPOSURE: Primary | ICD-10-CM

## 2022-01-04 PROCEDURE — U0003 INFECTIOUS AGENT DETECTION BY NUCLEIC ACID (DNA OR RNA); SEVERE ACUTE RESPIRATORY SYNDROME CORONAVIRUS 2 (SARS-COV-2) (CORONAVIRUS DISEASE [COVID-19]), AMPLIFIED PROBE TECHNIQUE, MAKING USE OF HIGH THROUGHPUT TECHNOLOGIES AS DESCRIBED BY CMS-2020-01-R: HCPCS | Performed by: FAMILY MEDICINE

## 2022-01-04 PROCEDURE — U0005 INFEC AGEN DETEC AMPLI PROBE: HCPCS | Performed by: FAMILY MEDICINE

## 2022-01-05 ENCOUNTER — TELEPHONE (OUTPATIENT)
Dept: OTHER | Facility: OTHER | Age: 47
End: 2022-01-05

## 2022-01-05 LAB — SARS-COV-2 RNA RESP QL NAA+PROBE: POSITIVE

## 2022-01-31 NOTE — PRE-PROCEDURE INSTRUCTIONS
Pre-Surgery Instructions:   Medication Instructions    Biktarvy -25 MG tablet Instructed patient per Anesthesia Guidelines   Multiple Vitamins-Minerals (MULTIVITAMIN ADULT) TABS Patient was instructed by Physician and understands  Pre-op medication, and showering instructions with antibacteral soap reviewed  Pt  Verbalized understanding of current visitor restrictions  Pt  Verbalized an understanding of all instructions reviewed and offers no concerns at this time  Instructed to avoid all ASA/NSAIDs and OTC Vit/Supp from now until after surgery per anesthesia guidelines   Tylenol ok prn  Instructed to take per normal schedule except DOS (Takes meds HS)

## 2022-02-02 ENCOUNTER — OFFICE VISIT (OUTPATIENT)
Dept: INFECTIOUS DISEASES | Facility: CLINIC | Age: 47
End: 2022-02-02
Payer: COMMERCIAL

## 2022-02-02 VITALS
SYSTOLIC BLOOD PRESSURE: 102 MMHG | DIASTOLIC BLOOD PRESSURE: 64 MMHG | HEART RATE: 68 BPM | OXYGEN SATURATION: 98 % | WEIGHT: 139.2 LBS | BODY MASS INDEX: 23.76 KG/M2 | HEIGHT: 64 IN | TEMPERATURE: 97.7 F

## 2022-02-02 DIAGNOSIS — B20 HIV INFECTION, UNSPECIFIED SYMPTOM STATUS (HCC): ICD-10-CM

## 2022-02-02 DIAGNOSIS — B20 HIV INFECTION, UNSPECIFIED SYMPTOM STATUS (HCC): Primary | ICD-10-CM

## 2022-02-02 DIAGNOSIS — D72.819 LEUKOPENIA, UNSPECIFIED TYPE: ICD-10-CM

## 2022-02-02 DIAGNOSIS — C51.9 VULVAR CARCINOMA (HCC): ICD-10-CM

## 2022-02-02 PROCEDURE — 99214 OFFICE O/P EST MOD 30 MIN: CPT | Performed by: INTERNAL MEDICINE

## 2022-02-02 RX ORDER — BICTEGRAVIR SODIUM, EMTRICITABINE, AND TENOFOVIR ALAFENAMIDE FUMARATE 50; 200; 25 MG/1; MG/1; MG/1
TABLET ORAL
Qty: 90 TABLET | Refills: 1 | Status: SHIPPED | OUTPATIENT
Start: 2022-02-02 | End: 2022-07-25 | Stop reason: SDUPTHER

## 2022-02-02 NOTE — H&P (VIEW-ONLY)
Progress Note - Infectious Disease   Valorie Howell Stagers 51 y o  female MRN: [id number]  Unit/Bed#:  Encounter: [id number]      Impression/Recommendations:  1   HIV disease   This had been well controlled with Atripla   ART was changed to [facility] AT [facility] 2019 due to side effect from Atripla   Patient is tolerating it well   Her HIV disease remains well controlled with CD4 > 800 and VL undetectable  Continue  Biktarvy  Follow-up in 6 months with labs      2   Leukopenia/neutropenia  WBC is stable, remaining on low end of normal   Monitor WBC/ANC     No change in medication for now      3  Noncompliance with medication and follow-up previously    Patient is now complying with medications again  Andreas Craft is showing compliance      4    Stage I SCC of vulva  Patient is status post wide excision  She is in remission      Discussed with patient in detail regarding the above plan  Follow-up in 6 months with labs      Antibiotics:  Biktarvy     Subjective:  Patient  had successful wide excision of well over cancer  She is in remission  She feels well  She is tolerating Biktarvy well      The following portions of the patient's history were reviewed and updated as appropriate: allergies, current medications, past medical history, past social history, past surgical history and problem list    ROS:  A complete review of systems was done  Except for what is noted above, the rest of the review of systems was negative  Objective:  Vitals:   Temperature: 97 7 °F (36 5 °C)    Physical Exam:     General: Awake, alert, cooperative, no distress  Neck:  Supple  No lymphadenopathy  No mass  Lungs: Expansion symmetric, no rales, no wheezing, respirations unlabored  Heart:  Regular rate and rhythm, S1 and S2 normal, no murmur  Abdomen: Soft, nondistended, non-tender, bowel sounds active all four quadrants,        no masses, no organomegaly  Extremities: No edema  No erythema/warmth  No ulcer   Nontender to palpation  Skin:  No rash  Neuro: Moves all extremities  Invasive Devices  Report    None                 Labs studies:   I have personally reviewed pertinent labs  Imaging Studies:   I have personally reviewed pertinent imaging study reports and images in PACS  EKG, Pathology, and Other Studies:   I have personally reviewed pertinent reports

## 2022-02-02 NOTE — PROGRESS NOTES
Progress Note - Infectious Disease   Valorie Cox 51 y o  female MRN: [id number]  Unit/Bed#:  Encounter: [id number]      Impression/Recommendations:  1   HIV disease   This had been well controlled with Atripla   ART was changed to [facility] AT [facility] 2019 due to side effect from Atripla   Patient is tolerating it well   Her HIV disease remains well controlled with CD4 > 800 and VL undetectable  Continue  Biktarvy  Follow-up in 6 months with labs      2   Leukopenia/neutropenia  WBC is stable, remaining on low end of normal   Monitor WBC/ANC     No change in medication for now      3  Noncompliance with medication and follow-up previously    Patient is now complying with medications again  Izetta Course is showing compliance      4    Stage I SCC of vulva  Patient is status post wide excision  She is in remission      Discussed with patient in detail regarding the above plan  Follow-up in 6 months with labs      Antibiotics:  Biktarvy     Subjective:  Patient  had successful wide excision of well over cancer  She is in remission  She feels well  She is tolerating Biktarvy well      The following portions of the patient's history were reviewed and updated as appropriate: allergies, current medications, past medical history, past social history, past surgical history and problem list    ROS:  A complete review of systems was done  Except for what is noted above, the rest of the review of systems was negative  Objective:  Vitals:   Temperature: 97 7 °F (36 5 °C)    Physical Exam:     General: Awake, alert, cooperative, no distress  Neck:  Supple  No lymphadenopathy  No mass  Lungs: Expansion symmetric, no rales, no wheezing, respirations unlabored  Heart:  Regular rate and rhythm, S1 and S2 normal, no murmur  Abdomen: Soft, nondistended, non-tender, bowel sounds active all four quadrants,        no masses, no organomegaly  Extremities: No edema  No erythema/warmth  No ulcer   Nontender to palpation  Skin:  No rash  Neuro: Moves all extremities  Invasive Devices  Report    None                 Labs studies:   I have personally reviewed pertinent labs  Imaging Studies:   I have personally reviewed pertinent imaging study reports and images in PACS  EKG, Pathology, and Other Studies:   I have personally reviewed pertinent reports

## 2022-02-03 ENCOUNTER — ANESTHESIA EVENT (OUTPATIENT)
Dept: PERIOP | Facility: AMBULARY SURGERY CENTER | Age: 47
End: 2022-02-03
Payer: COMMERCIAL

## 2022-02-03 NOTE — ANESTHESIA PREPROCEDURE EVALUATION
Procedure:  ANOSCOPY HIGH RESOLUTION (N/A Anus)    Relevant Problems   HEMATOLOGY   (+) Anemia      NEURO/PSYCH   (+) Encounter for follow-up surveillance of vulvar cancer   (+) History of HIV infection ([facility] [address] ) (Well Controlled)      Other   (+) Anal dysplasia   (+) Vulvar carcinoma (HCC) (S/P Vulvectomy)        Physical Exam    Airway    Mallampati score: II  TM Distance: >3 FB  Neck ROM: full     Dental       Cardiovascular      Pulmonary      Other Findings        Anesthesia Plan  ASA Score- 2     Anesthesia Type- IV sedation with anesthesia with ASA Monitors  Additional Monitors:   Airway Plan:           Plan Factors-Exercise tolerance (METS): >4 METS  Chart reviewed  Existing labs reviewed  Patient summary reviewed  Patient is not a current smoker  Patient not instructed to abstain from smoking on day of procedure  Patient did not smoke on day of surgery  Induction- intravenous  Postoperative Plan-     Informed Consent- Anesthetic plan and risks discussed with patient  I personally reviewed this patient with the CRNA  Discussed and agreed on the Anesthesia Plan with the CRNA  Evelyn Jo

## 2022-02-04 ENCOUNTER — HOSPITAL ENCOUNTER (OUTPATIENT)
Facility: AMBULARY SURGERY CENTER | Age: 47
Setting detail: OUTPATIENT SURGERY
Discharge: HOME | End: 2022-02-04
Attending: COLON & RECTAL SURGERY | Admitting: COLON & RECTAL SURGERY
Payer: COMMERCIAL

## 2022-02-04 ENCOUNTER — ANESTHESIA (OUTPATIENT)
Dept: PERIOP | Facility: AMBULARY SURGERY CENTER | Age: 47
End: 2022-02-04
Payer: COMMERCIAL

## 2022-02-04 VITALS
HEART RATE: 62 BPM | RESPIRATION RATE: 17 BRPM | OXYGEN SATURATION: 100 % | DIASTOLIC BLOOD PRESSURE: 51 MMHG | TEMPERATURE: 97.6 F | WEIGHT: 141 LBS | HEIGHT: 64 IN | SYSTOLIC BLOOD PRESSURE: 97 MMHG | BODY MASS INDEX: 24.07 KG/M2

## 2022-02-04 DIAGNOSIS — K62.82 ANAL DYSPLASIA: Primary | ICD-10-CM

## 2022-02-04 PROCEDURE — 45990 SURG DX EXAM ANORECTAL: CPT | Performed by: COLON & RECTAL SURGERY

## 2022-02-04 PROCEDURE — 46607 DIAGNOSTIC ANOSCOPY & BIOPSY: CPT | Performed by: COLON & RECTAL SURGERY

## 2022-02-04 RX ORDER — ALBUTEROL SULFATE 2.5 MG/3ML
2.5 SOLUTION RESPIRATORY (INHALATION) ONCE AS NEEDED
Status: DISCONTINUED | OUTPATIENT
Start: 2022-02-04 | End: 2022-02-04 | Stop reason: HOSPADM

## 2022-02-04 RX ORDER — METOCLOPRAMIDE HYDROCHLORIDE 5 MG/ML
10 INJECTION INTRAMUSCULAR; INTRAVENOUS ONCE AS NEEDED
Status: DISCONTINUED | OUTPATIENT
Start: 2022-02-04 | End: 2022-02-04 | Stop reason: HOSPADM

## 2022-02-04 RX ORDER — LIDOCAINE HYDROCHLORIDE 10 MG/ML
INJECTION, SOLUTION EPIDURAL; INFILTRATION; INTRACAUDAL; PERINEURAL AS NEEDED
Status: DISCONTINUED | OUTPATIENT
Start: 2022-02-04 | End: 2022-02-04 | Stop reason: HOSPADM

## 2022-02-04 RX ORDER — FENTANYL CITRATE 50 UG/ML
INJECTION, SOLUTION INTRAMUSCULAR; INTRAVENOUS AS NEEDED
Status: DISCONTINUED | OUTPATIENT
Start: 2022-02-04 | End: 2022-02-04

## 2022-02-04 RX ORDER — FENTANYL CITRATE/PF 50 MCG/ML
25 SYRINGE (ML) INJECTION
Status: DISCONTINUED | OUTPATIENT
Start: 2022-02-04 | End: 2022-02-04 | Stop reason: HOSPADM

## 2022-02-04 RX ORDER — GINSENG 100 MG
CAPSULE ORAL AS NEEDED
Status: DISCONTINUED | OUTPATIENT
Start: 2022-02-04 | End: 2022-02-04 | Stop reason: HOSPADM

## 2022-02-04 RX ORDER — HYDROCODONE BITARTRATE AND ACETAMINOPHEN 5; 325 MG/1; MG/1
1 TABLET ORAL EVERY 6 HOURS PRN
Qty: 5 TABLET | Refills: 0 | Status: SHIPPED | OUTPATIENT
Start: 2022-02-04 | End: 2022-02-14

## 2022-02-04 RX ORDER — BUPIVACAINE HYDROCHLORIDE 2.5 MG/ML
INJECTION, SOLUTION EPIDURAL; INFILTRATION; INTRACAUDAL AS NEEDED
Status: DISCONTINUED | OUTPATIENT
Start: 2022-02-04 | End: 2022-02-04 | Stop reason: HOSPADM

## 2022-02-04 RX ORDER — PROPOFOL 10 MG/ML
INJECTION, EMULSION INTRAVENOUS CONTINUOUS PRN
Status: DISCONTINUED | OUTPATIENT
Start: 2022-02-04 | End: 2022-02-04

## 2022-02-04 RX ORDER — LIDOCAINE HYDROCHLORIDE 10 MG/ML
INJECTION, SOLUTION EPIDURAL; INFILTRATION; INTRACAUDAL; PERINEURAL AS NEEDED
Status: DISCONTINUED | OUTPATIENT
Start: 2022-02-04 | End: 2022-02-04

## 2022-02-04 RX ORDER — MIDAZOLAM HYDROCHLORIDE 2 MG/2ML
INJECTION, SOLUTION INTRAMUSCULAR; INTRAVENOUS AS NEEDED
Status: DISCONTINUED | OUTPATIENT
Start: 2022-02-04 | End: 2022-02-04

## 2022-02-04 RX ORDER — MAGNESIUM HYDROXIDE 1200 MG/15ML
LIQUID ORAL AS NEEDED
Status: DISCONTINUED | OUTPATIENT
Start: 2022-02-04 | End: 2022-02-04 | Stop reason: HOSPADM

## 2022-02-04 RX ORDER — ONDANSETRON 2 MG/ML
INJECTION INTRAMUSCULAR; INTRAVENOUS AS NEEDED
Status: DISCONTINUED | OUTPATIENT
Start: 2022-02-04 | End: 2022-02-04

## 2022-02-04 RX ORDER — DEXAMETHASONE SODIUM PHOSPHATE 10 MG/ML
INJECTION, SOLUTION INTRAMUSCULAR; INTRAVENOUS AS NEEDED
Status: DISCONTINUED | OUTPATIENT
Start: 2022-02-04 | End: 2022-02-04

## 2022-02-04 RX ORDER — ACETIC ACID 5 %
LIQUID (ML) MISCELLANEOUS AS NEEDED
Status: DISCONTINUED | OUTPATIENT
Start: 2022-02-04 | End: 2022-02-04 | Stop reason: HOSPADM

## 2022-02-04 RX ORDER — LIDOCAINE HYDROCHLORIDE 10 MG/ML
0.5 INJECTION, SOLUTION EPIDURAL; INFILTRATION; INTRACAUDAL; PERINEURAL ONCE AS NEEDED
Status: DISCONTINUED | OUTPATIENT
Start: 2022-02-04 | End: 2022-02-04 | Stop reason: HOSPADM

## 2022-02-04 RX ORDER — ONDANSETRON 2 MG/ML
4 INJECTION INTRAMUSCULAR; INTRAVENOUS ONCE AS NEEDED
Status: DISCONTINUED | OUTPATIENT
Start: 2022-02-04 | End: 2022-02-04 | Stop reason: HOSPADM

## 2022-02-04 RX ORDER — PROPOFOL 10 MG/ML
INJECTION, EMULSION INTRAVENOUS AS NEEDED
Status: DISCONTINUED | OUTPATIENT
Start: 2022-02-04 | End: 2022-02-04

## 2022-02-04 RX ORDER — SODIUM CHLORIDE, SODIUM LACTATE, POTASSIUM CHLORIDE, CALCIUM CHLORIDE 600; 310; 30; 20 MG/100ML; MG/100ML; MG/100ML; MG/100ML
125 INJECTION, SOLUTION INTRAVENOUS CONTINUOUS
Status: DISCONTINUED | OUTPATIENT
Start: 2022-02-04 | End: 2022-02-04 | Stop reason: HOSPADM

## 2022-02-04 RX ADMIN — DEXAMETHASONE SODIUM PHOSPHATE 10 MG: 10 INJECTION, SOLUTION INTRAMUSCULAR; INTRAVENOUS at 11:05

## 2022-02-04 RX ADMIN — FENTANYL CITRATE 25 MCG: 50 INJECTION, SOLUTION INTRAMUSCULAR; INTRAVENOUS at 11:09

## 2022-02-04 RX ADMIN — ONDANSETRON 4 MG: 2 INJECTION INTRAMUSCULAR; INTRAVENOUS at 11:03

## 2022-02-04 RX ADMIN — SODIUM CHLORIDE, SODIUM LACTATE, POTASSIUM CHLORIDE, AND CALCIUM CHLORIDE: .6; .31; .03; .02 INJECTION, SOLUTION INTRAVENOUS at 10:19

## 2022-02-04 RX ADMIN — PROPOFOL 50 MG: 10 INJECTION, EMULSION INTRAVENOUS at 11:03

## 2022-02-04 RX ADMIN — FENTANYL CITRATE 25 MCG: 50 INJECTION, SOLUTION INTRAMUSCULAR; INTRAVENOUS at 11:26

## 2022-02-04 RX ADMIN — PROPOFOL 100 MCG/KG/MIN: 10 INJECTION, EMULSION INTRAVENOUS at 11:03

## 2022-02-04 RX ADMIN — MIDAZOLAM HYDROCHLORIDE 2 MG: 1 INJECTION, SOLUTION INTRAMUSCULAR; INTRAVENOUS at 10:57

## 2022-02-04 RX ADMIN — LIDOCAINE HYDROCHLORIDE 50 MG: 10 INJECTION, SOLUTION EPIDURAL; INFILTRATION; INTRACAUDAL at 11:03

## 2022-02-04 NOTE — OP NOTE
PERATIVE REPORT  PATIENT NAME: Delphine Bennett    :  1975  MRN: [id number]  Pt Location: [facility] OR ROOM 05    SURGERY DATE: 2022    Surgeon(s) and Role:     * Kehinde Paul MD - Primary    Preop Diagnosis:  Anal dysplasia [K62 82]    Post-Op Diagnosis Codes:     * Anal dysplasia [K62 82]    Procedure(s) (LRB):  ANOSCOPY HIGH RESOLUTION (N/A)  Fulguration of anal dysplasia  Exam under anesthesia    Specimen(s):  * No specimens in log *    Estimated Blood Loss:   Minimal    Drains:  * No LDAs found *    Anesthesia Type:   IV Sedation with Anesthesia    Operative Indications:  Anal dysplasia [K62 82]    Operative Findings:  -microscopic/miliary pinpoint areas of dysplasia right lateral, left lateral near prior excision, few pinpoint areas of dysplasia internal anal canal, these were all fulgurated with electrocautery  -recommend 1 year repeat HRA    Complications:   None    Procedure and Technique:  54yo female HIV history well controlled on Biktarvy  VIN2-3, small focus superficially invasive squamous cell carcinoma  C  Anus, 4:00 anus, excision:  Multifocal high-grade squamous intraepithelial lesion/vulvar intraepithelial neoplasia, grade 2-3, usual type (HSIL/uVIN 2-3)     We discussed AIN 2-3 by location  Colonoscopy , recall 10 years, we would likely update this after procedures/surveillance     We discussed AIN  II-III/High grade squamous intraepithelial lesion,risk for squamous cancer of anal canal, and need for high resolution anoscopy/excision-ablation  We discussed anorectal surgery and risks including not limited to bleeding, infection, risks of anesthesia, damage to sphincter/incontinence,staged surgery  She understood these risks and wishes to proceed  She was brought to the operating room where sedation anesthesia was induced without event  Venodynes were on and running throughout the case   She was placed in the prone jackknife position with attention to all extremities and bony prominences  The buttocks were taped apart Betadine prep  After timeout was taken per protocol, examination under anesthesia revealed normal findings, external, anoscopy showed normal as well   5% acetic acid soaked gauze was placed in the anal canal and perianal skin and perineum for a full 5 minutes count for acetowhite staining and with visual magnification and accessory lights, the area was reinspected and showed microscopic/miliary pinpoint areas of dysplasia right lateral, left lateral near prior excision, few pinpoint areas of dysplasia internal anal canal, these were all fulgurated with electrocautery, superficial, no deep dissection undertaken  Hemostasis was assured and Bacitracin/4 x 4 and mesh underwear were placed  Please note that the area was under a pudendal and regional block with lidocaine/Marcaine  All sponge, needle, instrument counts were correct and patient was rolled supine, awakened and taken to the recovery room in stable condition       I was present for the entire procedure     I was present for the entire procedure    Patient Disposition:  PACU       SIGNATURE: Brant Nicole MD  DATE: February 4, 2022  TIME: 11:43 AM

## 2022-02-04 NOTE — INTERVAL H&P NOTE
ASSESSMENT:    54yo female HIV history well controlled on Biktarvy  VIN2-3, small focus superficially invasive squamous cell carcinoma  C  Anus, 4:00 anus, excision:  Multifocal high-grade squamous intraepithelial lesion/vulvar intraepithelial neoplasia, grade 2-3, usual type (HSIL/uVIN 2-3)  We discussed AIN 2-3 by location  Colonoscopy 2015, recall 10 years, we would likely update this after procedures/surveillance     We discussed AIN  II-III/High grade squamous intraepithelial lesion,risk for squamous cancer of anal canal, and need for high resolution anoscopy/excision-ablation  We discussed anorectal surgery and risks including not limited to bleeding, infection, risks of anesthesia, damage to sphincter/incontinence,staged surgery  She understood these risks and wishes to proceed  PLAN:  Exam under anesthesia/High resolution anoscopy,    H&P reviewed  After examining the patient I find no changes in the patients condition since the H&P had been written      Vitals:    02/04/22 1012   BP: 108/64   Pulse: 56   Resp: 18   Temp: (!) 96 8 °F (36 °C)   SpO2: 100%

## 2022-02-04 NOTE — ANESTHESIA POSTPROCEDURE EVALUATION
Post-Op Assessment Note    CV Status:  Stable    Pain management: adequate     Mental Status:  Sleepy   Hydration Status:  Euvolemic   PONV Controlled:  Controlled   Airway Patency:  Patent      Post Op Vitals Reviewed: Yes      Staff: CRNA         No complications documented      BP   89/50   Temp  97 4   Pulse  52   Resp   16   SpO2   100%

## 2022-03-01 ENCOUNTER — OFFICE VISIT (OUTPATIENT)
Dept: GYNECOLOGIC ONCOLOGY | Facility: CLINIC | Age: 47
End: 2022-03-01
Payer: COMMERCIAL

## 2022-03-01 VITALS
OXYGEN SATURATION: 98 % | BODY MASS INDEX: 24.41 KG/M2 | TEMPERATURE: 97.3 F | HEART RATE: 88 BPM | WEIGHT: 143 LBS | RESPIRATION RATE: 16 BRPM | DIASTOLIC BLOOD PRESSURE: 60 MMHG | SYSTOLIC BLOOD PRESSURE: 112 MMHG | HEIGHT: 64 IN

## 2022-03-01 DIAGNOSIS — Z08 ENCOUNTER FOR FOLLOW-UP SURVEILLANCE OF VULVAR CANCER: ICD-10-CM

## 2022-03-01 DIAGNOSIS — Z85.44 ENCOUNTER FOR FOLLOW-UP SURVEILLANCE OF VULVAR CANCER: ICD-10-CM

## 2022-03-01 DIAGNOSIS — C51.9 VULVAR CARCINOMA (HCC): Primary | ICD-10-CM

## 2022-03-01 PROCEDURE — 99212 OFFICE O/P EST SF 10 MIN: CPT | Performed by: PHYSICIAN ASSISTANT

## 2022-03-01 PROCEDURE — 56820 COLPOSCOPY VULVA: CPT | Performed by: PHYSICIAN ASSISTANT

## 2022-03-01 NOTE — PROGRESS NOTES
Assessment/Plan:    Problem List Items Addressed This Visit        Genitourinary    Vulvar carcinoma ([facility] [address] ) - Primary     History of stage IA SCC of the vulva with personal history of HIV  She is s/p surgical resection in August 2021  She is without evidence of recurrence and negative vulvar colposcopy  Return to the office in 3 months for continued surveillance  The patient also has a routine gynecologist who she follows with regularly  Other    Encounter for follow-up surveillance of vulvar cancer            CHIEF COMPLAINT:   Vulvar cancer surveillance    Problem:  Cancer Staging  V[facility]  Staging form: Vulva, AJCC 8th Edition  - Clinical stage from 8/20/2021: FIGO Stage IA - Signed by Marquise Prescott PA-C on 3/1/2022      Previous therapy:  Oncology History   Vulvar carcinoma ([facility] )   7/2/2021 Biopsy    Invasive SCC of the vulva     7/12/2021 Initial Diagnosis    Vulvar carcinoma ([facility] )     8/20/2021 Surgery    Wide local vulvectomy  Fior-anal resection           Patient ID: Haider Tierney is a 51 y o  female  who has no new complaints today  She denies new vulvar irritation/itching or lesions  Normal bowel and bladder function  She underwent fulguration of anal lesions in early February  The patient notes she is current on pelvic exam/PAP smears and follows with routine gynecologist  Quality of life is good  The following portions of the patient's history were reviewed and updated as appropriate: allergies, current medications, past medical history, past surgical history and problem list     Review of Systems   Constitutional: Negative  HENT: Negative  Eyes: Negative  Respiratory: Negative  Cardiovascular: Negative  Gastrointestinal: Negative  Genitourinary: Negative  Musculoskeletal: Negative  Skin: Negative  Neurological: Negative  Psychiatric/Behavioral: Negative          Current Outpatient Medications   Medication Sig Dispense Refill    Biktarvy -25 MG tablet TAKE 1 TABLET BY MOUTH EVERY DAY WITH BREAKFAST 90 tablet 1    Multiple Vitamins-Minerals (MULTIVITAMIN ADULT) TABS Take 1 tablet by mouth daily       No current facility-administered medications for this visit  Objective:    Blood pressure 112/60, pulse 88, temperature (!) 97 3 °F (36 3 °C), temperature source Temporal, resp  rate 16, height 5' 4" (1 626 m), weight 64 9 kg (143 lb), SpO2 98 %, not currently breastfeeding  Body mass index is 24 55 kg/m²  Body surface area is 1 7 meters squared  Physical Exam  Vitals reviewed  Exam conducted with a chaperone present  Constitutional:       General: She is not in acute distress  Appearance: Normal appearance  She is not ill-appearing  HENT:      Head: Normocephalic and atraumatic  Mouth/Throat:      Mouth: Mucous membranes are moist    Eyes:      General:         Right eye: No discharge  Left eye: No discharge  Conjunctiva/sclera: Conjunctivae normal    Pulmonary:      Effort: Pulmonary effort is normal    Genitourinary:     Comments: The external female genitalia is normal  The bartholin's, uretheral and skenes glands are normal  The urethral meatus is normal (midline with no lesions)  Anus without fissure or lesion  See colposcopy note  Musculoskeletal:      Right lower leg: No edema  Left lower leg: No edema  Skin:     General: Skin is warm and dry  Coloration: Skin is not jaundiced  Findings: No rash  Neurological:      General: No focal deficit present  Mental Status: She is alert and oriented to person, place, and time  Cranial Nerves: No cranial nerve deficit  Sensory: No sensory deficit  Motor: No weakness  Gait: Gait normal    Psychiatric:         Mood and Affect: Mood normal          Behavior: Behavior normal          Thought Content:  Thought content normal          Judgment: Judgment normal        Colposcopy    Date/Time: 3/1/2022 1:49 PM  Performed by: Jennifer Huerta PA-C  Authorized by: Jennifer Huerta PA-C     Consent:     Consent obtained:  Verbal  Procedure:     Procedure: Colposcopy of Vulva only    Comments:      Evaluation of vulva without lesions  External female genitalia bathed in 5% acetic acid  No acetowhite changes  No biopsy performed  Patient tolerated the procedure well

## 2022-03-01 NOTE — ASSESSMENT & PLAN NOTE
History of stage IA SCC of the vulva with personal history of HIV  She is s/p surgical resection in August 2021  She is without evidence of recurrence and negative vulvar colposcopy  Return to the office in 3 months for continued surveillance  The patient also has a routine gynecologist who she follows with regularly

## 2022-06-06 ENCOUNTER — TELEPHONE (OUTPATIENT)
Dept: CARDIAC SURGERY | Facility: CLINIC | Age: 47
End: 2022-06-06

## 2022-06-06 NOTE — TELEPHONE ENCOUNTER
Spoke with patient regarding rescheduling 6/7 appt with Elaine Bradshaw, she will be out of the office   Patient states she will call back to reschedule

## 2022-07-05 PROBLEM — R92.2 DENSE BREAST TISSUE ON MAMMOGRAM: Status: ACTIVE | Noted: 2022-07-05

## 2022-07-05 PROBLEM — R92.30 DENSE BREAST TISSUE ON MAMMOGRAM: Status: ACTIVE | Noted: 2022-07-05

## 2022-07-05 PROBLEM — C51.9 PRIMARY VULVAR SQUAMOUS CELL CARCINOMA (HCC): Status: ACTIVE | Noted: 2022-07-05

## 2022-07-05 PROBLEM — Z90.710 HISTORY OF ABDOMINAL HYSTERECTOMY: Status: ACTIVE | Noted: 2022-07-05

## 2022-07-25 DIAGNOSIS — B20 HIV INFECTION, UNSPECIFIED SYMPTOM STATUS (HCC): ICD-10-CM

## 2022-07-25 RX ORDER — BICTEGRAVIR SODIUM, EMTRICITABINE, AND TENOFOVIR ALAFENAMIDE FUMARATE 50; 200; 25 MG/1; MG/1; MG/1
1 TABLET ORAL
Qty: 90 TABLET | Refills: 0 | Status: SHIPPED | OUTPATIENT
Start: 2022-07-25

## 2022-07-25 NOTE — TELEPHONE ENCOUNTER
Contacted patient to let her know d/t schedule change, unable to see since no labs yet  Would r/s once Dr Alvaro Storey is back in the office  Patient will get labs, but requests refill to pharmacy

## 2022-08-09 ENCOUNTER — TELEPHONE (OUTPATIENT)
Dept: INFECTIOUS DISEASES | Facility: CLINIC | Age: 47
End: 2022-08-09

## 2022-08-09 NOTE — TELEPHONE ENCOUNTER
Pt called stating she went to [facility] to get her labs done but that they never received the fax for her labs  Informed her we could fax it over to them   She asked it to be faxed to [facility] at [place] in [place], fax [phone]

## 2022-11-03 DIAGNOSIS — B20 HIV INFECTION, UNSPECIFIED SYMPTOM STATUS (HCC): ICD-10-CM

## 2022-11-03 RX ORDER — BICTEGRAVIR SODIUM, EMTRICITABINE, AND TENOFOVIR ALAFENAMIDE FUMARATE 50; 200; 25 MG/1; MG/1; MG/1
TABLET ORAL
Qty: 90 TABLET | Refills: 0 | Status: SHIPPED | OUTPATIENT
Start: 2022-11-03

## 2022-11-14 ENCOUNTER — TELEPHONE (OUTPATIENT)
Dept: INFECTIOUS DISEASES | Facility: CLINIC | Age: 47
End: 2022-11-14

## 2022-11-14 NOTE — TELEPHONE ENCOUNTER
Called pt today regarding scheduling a follow up appt  Pt is scheduled 12/13 at 1PM with Dr Clementina Lloyd      Also reminded pt to have lab work done prior to that appt

## 2022-12-07 LAB
EXTERNAL CHLAMYDIA RESULT: NOT DETECTED
EXTERNAL HIV CONFIRMATION: NORMAL
N GONORRHOEA RRNA SPEC QL PROBE: NOT DETECTED

## 2022-12-13 ENCOUNTER — OFFICE VISIT (OUTPATIENT)
Dept: INFECTIOUS DISEASES | Facility: CLINIC | Age: 47
End: 2022-12-13

## 2022-12-13 VITALS
DIASTOLIC BLOOD PRESSURE: 60 MMHG | OXYGEN SATURATION: 97 % | SYSTOLIC BLOOD PRESSURE: 108 MMHG | WEIGHT: 135 LBS | TEMPERATURE: 97.5 F | BODY MASS INDEX: 23.05 KG/M2 | RESPIRATION RATE: 18 BRPM | HEART RATE: 78 BPM | HEIGHT: 64 IN

## 2022-12-13 DIAGNOSIS — B20 HIV INFECTION, UNSPECIFIED SYMPTOM STATUS (HCC): Primary | ICD-10-CM

## 2022-12-13 DIAGNOSIS — C51.9 VULVAR CARCINOMA (HCC): ICD-10-CM

## 2022-12-13 DIAGNOSIS — D72.819 LEUKOPENIA, UNSPECIFIED TYPE: ICD-10-CM

## 2022-12-13 NOTE — PATIENT INSTRUCTIONS
Continue your medication as ordered  Please call us if refills are needed  Labs in around 5 months  Follow up in around 6 months, or as needed sooner

## 2022-12-13 NOTE — PROGRESS NOTES
Progress Note - Infectious Disease   Valorie Zaldivar 49 y o  female MRN: [id number]  Unit/Bed#:  Encounter: [id number]      Impression/Recommendations:  1   HIV disease   This had been well controlled with Atripla   ART was changed to [facility] AT [facility] 2019 due to side effect from Atripla   Patient is tolerating it well   Her HIV disease remains well controlled with CD4 > 800 and VL undetectable  Continue  Biktarvy  Follow-up in 6 months with labs      2   Leukopenia/neutropenia  WBC is stable, remaining on low end of normal   Monitor WBC/ANC     No change in medication for now      3  Noncompliance with medication and follow-up previously    Patient is now complying with medications again  Christopher Coronado is showing compliance      4    Stage I SCC of vulva  Patient is status post wide excision  She is in remission  GYN follow-up       Discussed with patient in detail regarding the above plan  Follow-up in 6 months with labs      Antibiotics:  Biktarvy     Subjective:  Patient feels well  She is tolerating Biktarvy well  No nausea, vomiting or diarrhea      The following portions of the patient's history were reviewed and updated as appropriate: allergies, current medications, past medical history, past social history, past surgical history and problem list    ROS:  A complete review of systems was done  Except for what is noted above, the rest of the review of systems was negative  Objective:  Vitals:  Temperature: 97 5 °F (36 4 °C)    Physical Exam:     General: Awake, alert, cooperative, no distress  Neck:  Supple  No lymphadenopathy  No mass  Lungs: Expansion symmetric, no rales, no wheezing, respirations unlabored  Heart:  Regular rate and rhythm, S1 and S2 normal, no murmur  Abdomen: Soft, nondistended, non-tender, bowel sounds active all four quadrants,        no masses, no organomegaly  Extremities: No edema  No erythema/warmth  No ulcer  Nontender to palpation     Skin:  No rash    Neuro: Moves all extremities  Invasive Devices     None                 Labs studies:   I have personally reviewed pertinent labs  Imaging Studies:   I have personally reviewed pertinent imaging study reports and images in PACS  EKG, Pathology, and Other Studies:   I have personally reviewed pertinent reports

## 2023-01-04 NOTE — PROGRESS NOTES
Assessment/Plan:  Normal gynecologic exam  Hx of HIV, RICK  Vulvar depigmentation '20 with new onset nodules 6/21 - Bx squamous cell carcinoma of the vulva 7/21- continued FU with Yenni Ingram Vulvoscopy 10/21 [ possible LS], and 3/22  Return to the office in 1 year  Self breast exams once a month  3-D mammography annually - overdue, ordered  Dense Breasts - ABUS explained   "  ,      "  Colonoscopy p 45- done '16 pre Hyst  Calcium 1,000 mg daily with vitamin D- to add 1 supplement  Exercise 3 times a week- at the gym '18, lost 14 lbs , gained 5 '21         Diagnoses and all orders for this visit:    Encounter for annual routine gynecological examination    Encounter for screening mammogram for breast cancer    Dense breast tissue on mammogram    History of abdominal hysterectomy    Primary vulvar squamous cell carcinoma ([facility] )    History of HIV infection ([place] [address] )    Anal dysplasia      Orders for 3D mammography and ABUS placed        Subjective:        Patient ID: Klaudia Dick is a 49 y o  female  Delisa Duque returns for a yearly evaluation  She had a colposcopy of the vulva in March at [facility] oncology  Because the appearance was normal no biopsies were performed  She has no gynecologic complaints  She has not been in a relationship since the death of her   The following portions of the patient's history were reviewed and updated as appropriate: She  has a past medical history of Anemia, Asthma, Cancer ([facility] ), History of transfusion (2016), and HIV (human immunodeficiency virus infection) ([place] [address] )    Patient Active Problem List    Diagnosis Date Noted   • History of abdominal hysterectomy 07/05/2022   • Primary vulvar squamous cell carcinoma ([facility] ) 07/05/2022   • Dense breast tissue on mammogram 07/05/2022   • Post-operative state 10/25/2021   • Encounter for follow-up surveillance of vulvar cancer 10/12/2021   • Anal dysplasia 61/26/3825   • Folliculitis 39/26/2672   • Vulvar carcinoma ([facility] ) 07/12/2021   • Symptomatic varicose veins, bilateral 2020   • Encounter for annual routine gynecological examination 2019   • Encounter for screening mammogram for breast cancer 2019   • History of HIV infection ([facility] ) 2019   • Ovarian benign neoplasm 2016   • Dysmenorrhea 2016   • Anemia 2016   PMH:   2 para 1; SAVD '98, VIP '00  Vulvar growths- JAKUB 3/CIS; LONG 1   Laser ablation of the vulva, cervix, perianal region  Dr Alea Lyn  HIV positive  LONG-3, HR-HPV; LEEP, normal ECC, EMB   Menorrhagia with anemia- hemoglobin of 7; transfused 3/14  Enlarging fibroid '15- failed oral contraceptives for menorrhagia, stopped   Dysmenorrhea, menorrhagia, failed conservative therapy, dyspareunia- RICK- Bilateral Salpingectomy, R Ovarian Bx- 16      Anemia- Fe infusions '17        Vulvar Ca - Bx [me] , Dr Hermilo Bettencourt  Wide local vulvectomy with Fior-anal resection - Stage 1A [facility]       Anal Dysplasia - Dr Isabella Bulladr , [address] [place] [address]       Neg Vulvar Colposcopy 3/22  She  has a past surgical history that includes pr total abdom hysterectomy (N/A, 2016); Hysterectomy (); Colonoscopy; pr part simple remv vulva (N/A, 2021); Rectal biopsy (N/A, 2021); and  diagnostic anoscopy & biopsy (N/A, 2022)  Her family history includes Diabetes in her father and mother; No Known Problems in her brother, brother, maternal aunt, maternal aunt, maternal grandfather, maternal grandmother, paternal aunt, paternal aunt, paternal aunt, paternal grandfather, paternal grandmother, sister, sister, and son  FH:  M- Breast Ca 69- - Lumpectomy, RADS, Tamoxifen  F- Prostate Ca 68- , L Kidney Ca  67  She  reports that she has never smoked  She has never used smokeless tobacco  She reports that she does not drink alcohol and does not use drugs  SH:  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, an [place] in the [facility]   age 23  Condoms were used for protection for her  who was HIV negative   They were  and he unexpectedly  in 3/21 of an MI  Their son is autistic  Current Outpatient Medications   Medication Sig Dispense Refill   • Biktarvy -25 MG tablet TAKE 1 TABLET BY MOUTH EVERY DAY WITH BREAKFAST 90 tablet 1   • Multiple Vitamins-Minerals (MULTIVITAMIN ADULT) TABS Take 1 tablet by mouth daily       No current facility-administered medications for this visit  Current Outpatient Medications on File Prior to Visit   Medication Sig   • Biktarvy -25 MG tablet TAKE 1 TABLET BY MOUTH EVERY DAY WITH BREAKFAST   • Multiple Vitamins-Minerals (MULTIVITAMIN ADULT) TABS Take 1 tablet by mouth daily     No current facility-administered medications on file prior to visit  She has No Known Allergies       Review of Systems   Constitutional: Negative for activity change, appetite change, fatigue and unexpected weight change  Eyes: Negative for visual disturbance  Respiratory: Negative for cough, chest tightness, shortness of breath and wheezing  Cardiovascular: Negative for chest pain, palpitations and leg swelling  Breast: Patient denies tenderness, nipple discharge, masses, or erythema  Gastrointestinal: Negative for abdominal distention, abdominal pain, blood in stool, constipation, diarrhea, nausea and vomiting  Endocrine: Negative for cold intolerance and heat intolerance  Genitourinary: Negative for decreased urine volume, difficulty urinating, dysuria, frequency, hematuria, menstrual problem, pelvic pain, urgency, vaginal bleeding, vaginal discharge and vaginal pain  Musculoskeletal: Positive for back pain (Related to work)  Negative for arthralgias  Skin: Negative for rash  Neurological: Negative for weakness, light-headedness, numbness and headaches  Hematological: Does not bruise/bleed easily  Psychiatric/Behavioral: Negative for agitation, behavioral problems, dysphoric mood and sleep disturbance  The patient is not nervous/anxious  Objective:    /70 (BP Location: Right arm, Patient Position: Sitting, Cuff Size: Standard)   Ht 5' 3" (1 6 m)   Wt 69 kg (152 lb 3 2 oz)   BMI 26 96 kg/m²       Physical Exam  Constitutional:       Appearance: She is well-developed  Genitourinary:      Vulva and urethral meatus normal       Genitourinary Comments: The urethral meatus is normal   The perineum is within normal limits  The bladder is well supported  The uterus and cervix are surgically absent  The vulva is fairly symmetrical considering she has had a vulvectomy        Right Adnexa: not tender, not full, no mass present and not absent  Left Adnexa: not tender, not full, no mass present and not absent  Cervix is absent  Uterus is absent  Breasts:     Breasts are symmetrical       Right: No inverted nipple, mass, nipple discharge, skin change or tenderness  Left: No inverted nipple, mass, nipple discharge, skin change or tenderness  HENT:      Head: Normocephalic and atraumatic  Eyes:      General: No scleral icterus  Right eye: No discharge  Left eye: No discharge  Extraocular Movements: Extraocular movements intact  Conjunctiva/sclera: Conjunctivae normal    Neck:      Thyroid: No thyromegaly  Vascular: No JVD  Trachea: No tracheal deviation  Cardiovascular:      Rate and Rhythm: Normal rate and regular rhythm  Heart sounds: Normal heart sounds  No murmur heard  No gallop  Pulmonary:      Effort: Pulmonary effort is normal  No respiratory distress  Breath sounds: Normal breath sounds  No wheezing or rales  Chest:      Chest wall: No tenderness  Abdominal:      General: There is no distension  Palpations: Abdomen is soft  There is no mass  Tenderness: There is no abdominal tenderness   There is no guarding or rebound  Hernia: No hernia is present  Musculoskeletal:         General: No tenderness or deformity  Normal range of motion  Cervical back: Normal range of motion and neck supple  Lymphadenopathy:      Cervical: No cervical adenopathy  Neurological:      Mental Status: She is alert and oriented to person, place, and time  Skin:     General: Skin is warm and dry  Psychiatric:         Mood and Affect: Mood normal          Behavior: Behavior normal          Thought Content: Thought content normal          Judgment: Judgment normal    Vitals and nursing note reviewed  Exam conducted with a chaperone present

## 2023-01-05 ENCOUNTER — ANNUAL EXAM (OUTPATIENT)
Dept: OBGYN CLINIC | Facility: CLINIC | Age: 48
End: 2023-01-05

## 2023-01-05 VITALS
WEIGHT: 152.2 LBS | SYSTOLIC BLOOD PRESSURE: 110 MMHG | DIASTOLIC BLOOD PRESSURE: 70 MMHG | HEIGHT: 63 IN | BODY MASS INDEX: 26.97 KG/M2

## 2023-01-05 DIAGNOSIS — R92.2 DENSE BREAST TISSUE ON MAMMOGRAM: ICD-10-CM

## 2023-01-05 DIAGNOSIS — Z12.31 ENCOUNTER FOR SCREENING MAMMOGRAM FOR BREAST CANCER: ICD-10-CM

## 2023-01-05 DIAGNOSIS — C51.9 PRIMARY VULVAR SQUAMOUS CELL CARCINOMA (HCC): ICD-10-CM

## 2023-01-05 DIAGNOSIS — K62.82 ANAL DYSPLASIA: ICD-10-CM

## 2023-01-05 DIAGNOSIS — Z01.419 ENCOUNTER FOR ANNUAL ROUTINE GYNECOLOGICAL EXAMINATION: Primary | ICD-10-CM

## 2023-01-05 DIAGNOSIS — B20 HISTORY OF HIV INFECTION (HCC): ICD-10-CM

## 2023-01-05 DIAGNOSIS — Z90.710 HISTORY OF ABDOMINAL HYSTERECTOMY: ICD-10-CM

## 2023-01-13 DIAGNOSIS — B20 HIV INFECTION, UNSPECIFIED SYMPTOM STATUS (HCC): ICD-10-CM

## 2023-01-13 RX ORDER — BICTEGRAVIR SODIUM, EMTRICITABINE, AND TENOFOVIR ALAFENAMIDE FUMARATE 50; 200; 25 MG/1; MG/1; MG/1
1 TABLET ORAL
Qty: 90 TABLET | Refills: 0 | Status: SHIPPED | OUTPATIENT
Start: 2023-01-13

## 2023-01-13 NOTE — TELEPHONE ENCOUNTER
Pt comes to office today regarding refills  Pt states she needs her Biktarvy sent to a new pharmacy  Pharmacy updated in pts chart  Called [facility] pharmacy pt had on file and cancelled previous [address] orders

## 2023-02-17 ENCOUNTER — HOSPITAL ENCOUNTER (OUTPATIENT)
Dept: MAMMOGRAPHY | Facility: CLINIC | Age: 48
Discharge: HOME | End: 2023-02-17

## 2023-02-17 ENCOUNTER — HOSPITAL ENCOUNTER (OUTPATIENT)
Dept: ULTRASOUND IMAGING | Facility: CLINIC | Age: 48
Discharge: HOME | End: 2023-02-17

## 2023-02-17 VITALS — WEIGHT: 152.12 LBS | HEIGHT: 63 IN | BODY MASS INDEX: 26.95 KG/M2

## 2023-02-17 DIAGNOSIS — Z12.31 ENCOUNTER FOR SCREENING MAMMOGRAM FOR BREAST CANCER: ICD-10-CM

## 2023-02-17 DIAGNOSIS — R92.2 DENSE BREAST TISSUE ON MAMMOGRAM: ICD-10-CM

## 2023-02-23 ENCOUNTER — TELEPHONE (OUTPATIENT)
Dept: OBGYN CLINIC | Facility: CLINIC | Age: 48
End: 2023-02-23

## 2023-02-23 NOTE — TELEPHONE ENCOUNTER
Per comm consent lm to pt with results and recommendations of imaging from Dr Lindsay Schwab   All WNL

## 2023-02-23 NOTE — TELEPHONE ENCOUNTER
----- Message from Wilfrido Talavera MD sent at 2/23/2023 12:23 PM EST -----  Results are normal-both the mammogram and automated breast ultrasound  Please notify patient

## 2023-05-02 ENCOUNTER — TELEPHONE (OUTPATIENT)
Dept: INFECTIOUS DISEASES | Facility: CLINIC | Age: 48
End: 2023-05-02

## 2023-05-02 NOTE — TELEPHONE ENCOUNTER
Patient calls office today stating she still needs refills on her Lenka Raring  Informed patient will notify provider

## 2023-05-23 ENCOUNTER — TELEPHONE (OUTPATIENT)
Dept: INFECTIOUS DISEASES | Facility: CLINIC | Age: 48
End: 2023-05-23

## 2023-05-23 NOTE — TELEPHONE ENCOUNTER
Called and lmom for patient today  Reminded patient of her upcoming appointment and to have labs done prior to appointment  Informed patient if there are any further questions to call the office

## 2023-05-26 ENCOUNTER — OFFICE VISIT (OUTPATIENT)
Dept: INFECTIOUS DISEASES | Facility: CLINIC | Age: 48
End: 2023-05-26

## 2023-05-26 VITALS
WEIGHT: 151 LBS | TEMPERATURE: 97.6 F | HEIGHT: 63 IN | HEART RATE: 68 BPM | OXYGEN SATURATION: 99 % | SYSTOLIC BLOOD PRESSURE: 105 MMHG | RESPIRATION RATE: 18 BRPM | DIASTOLIC BLOOD PRESSURE: 75 MMHG | BODY MASS INDEX: 26.75 KG/M2

## 2023-05-26 DIAGNOSIS — C51.9 VULVAR CARCINOMA (HCC): ICD-10-CM

## 2023-05-26 DIAGNOSIS — D72.819 LEUKOPENIA, UNSPECIFIED TYPE: ICD-10-CM

## 2023-05-26 DIAGNOSIS — B20 HIV INFECTION, UNSPECIFIED SYMPTOM STATUS (HCC): Primary | ICD-10-CM

## 2023-05-26 NOTE — PROGRESS NOTES
Progress Note - Infectious Disease   Valorie Stewart 51 y o  female MRN: [id number]  Unit/Bed#:  Encounter: [id number]      Impression/Recommendations:  1   HIV disease   This had been well controlled with Atripla   ART was changed to [address] early 2019 due to side effect from Atripla   Patient is tolerating it well   Her HIV disease remains well controlled with CD4 > 600 and VL remains undetectable  Continue  Biktarvy  Follow-up in 6 months with labs      2   Leukopenia/neutropenia   WBC is stable, remaining on low end of normal   Monitor WBC/ANC     No change in medication for now      3  Noncompliance with prior ART, mostly secondary to side effect, and follow-up previously    Patient is now complying with Biktarvy again  Saran Miles is showing compliance      4    Stage I SCC of vulva   Patient is status post wide excision   She is in remission  GYN follow-up       Discussed with patient in detail regarding the above plan  Follow-up in 6 months with labs      Antibiotics:  Biktarvy     Subjective:  Patient feels well  She is tolerating Biktarvy well  No nausea, vomiting or diarrhea  She is compliant with her medication  The following portions of the patient's history were reviewed and updated as appropriate: allergies, current medications, past medical history, past social history, past surgical history and problem list    ROS:  A complete review of systems was done  Except for what is noted above, the rest of the review of systems was negative  Objective:  Vitals:   Temperature: 97 6 °F (36 4 °C)    Physical Exam:     General: Awake, alert, cooperative, no distress  Neck:  Supple  No lymphadenopathy  No mass  Lungs: Expansion symmetric, no rales, no wheezing, respirations unlabored  Heart:  Regular rate and rhythm, S1 and S2 normal, no murmur  Abdomen: Soft, nondistended, non-tender, bowel sounds active all four quadrants,        no masses, no organomegaly  Extremities: No edema  No erythema/warmth  No ulcer  Nontender to palpation  Skin:  No rash  Neuro: Moves all extremities  Invasive Devices     None                 Labs studies:   I have personally reviewed pertinent labs  Imaging Studies:   I have personally reviewed pertinent imaging study reports and images in PACS  EKG, Pathology, and Other Studies:   I have personally reviewed pertinent reports

## 2023-12-28 ENCOUNTER — TELEPHONE (OUTPATIENT)
Dept: INFECTIOUS DISEASES | Facility: CLINIC | Age: 48
End: 2023-12-28

## 2023-12-28 NOTE — TELEPHONE ENCOUNTER
LM for pt regarding upcoming appointment.  Need labs prior to visit.  Nothing in [facility].   Need to r/s til after labs drawn/finalized.

## 2024-01-07 NOTE — PROGRESS NOTES
Assessment/Plan:  Normal gynecologic exam  Hx of HIV, RICK  Vulvar depigmentation '20 with new onset nodules 6/21 - Bx squamous cell carcinoma of the vulva 7/21- continued FU with Chava Ingram . Neg Vulvoscopy 10/21 [ possible LS], and 3/22  Return to the office in 1 year  Self breast exams once a month  3-D mammography annually - due 2/24  Dense Breasts - ABUS   Colonoscopy p 45- done '16 pre Hyst. Due now  Calcium 1,000 mg daily with vitamin D- to add 1 supplement  Exercise 3 times a week- at the gym '18, lost 14 lbs., gained 5 '21         Diagnoses and all orders for this visit:    Encounter for annual routine gynecological examination    Encounter for screening mammogram for breast cancer    Dense breast tissue on mammogram    History of abdominal hysterectomy    Primary vulvar squamous cell carcinoma (HCC)    History of HIV infection (HCC)    Anal dysplasia    Orders for mammography and automated breast ultrasound given          Subjective:        Patient ID: Valorie Coto is a 47 y.o. female.    Chica returns for an annual visit.  She has no gynecological complaints.  She is not sexually active.  She denies any bleeding.  She has noted no changes in the vulva.         The following portions of the patient's history were reviewed and updated as appropriate: She  has a past medical history of Anemia, Asthma, Cancer (HCC), History of transfusion (2016), and HIV (human immunodeficiency virus infection) (HCC).  Patient Active Problem List    Diagnosis Date Noted    History of abdominal hysterectomy 07/05/2022    Primary vulvar squamous cell carcinoma (HCC) 07/05/2022    Dense breast tissue on mammogram 07/05/2022    Post-operative state 10/25/2021    Encounter for follow-up surveillance of vulvar cancer 10/12/2021    Anal dysplasia 09/01/2021    Folliculitis 07/15/2021    Vulvar carcinoma (HCC) 07/12/2021    Symptomatic varicose veins, bilateral 08/21/2020    Encounter for annual routine gynecological examination  2019    Encounter for screening mammogram for breast cancer 2019    History of HIV infection (HCC) 2019    Ovarian benign neoplasm 2016    Dysmenorrhea 2016    Anemia 2016   PMH:   2 para 1; SAVD '98, VIP '00  Vulvar growths- JAKUB 3/CIS; LONG 1   Laser ablation of the vulva, cervix, perianal region  Dr. Dodson  HIV positive  LONG-3, HR-HPV; LEEP, normal ECC, EMB   Menorrhagia with anemia- hemoglobin of 7; transfused 3/14  Enlarging fibroid '15- failed oral contraceptives for menorrhagia, stopped   Dysmenorrhea, menorrhagia, failed conservative therapy, dyspareunia- RICK- Bilateral Salpingectomy, R Ovarian Bx- 16      Anemia- Fe infusions '17        Vulvar Ca - Bx [me] , Dr. Hyman  Wide local vulvectomy with Fior-anal resection - Stage 1A SCC       Anal Dysplasia - Dr. Weaver , Anoscopy       Neg Vulvar Colposcopy 3/22  She  has a past surgical history that includes pr total abdom hysterectomy (N/A, 2016); Hysterectomy (); Colonoscopy; pr part simple remv vulva (N/A, 2021); Rectal biopsy (N/A, 2021); and pa641 diagnostic anoscopy & biopsy (N/A, 2022).  Her family history includes Diabetes in her father and mother; No Known Problems in her brother, brother, maternal aunt, maternal aunt, maternal grandfather, maternal grandmother, paternal aunt, paternal aunt, paternal aunt, paternal grandfather, paternal grandmother, sister, sister, and son.   FH:  M- Breast Ca 70- - Lumpectomy, RADS, Tamoxifen  F- Prostate Ca 68- , L Kidney Ca  67  She  reports that she has never smoked. She has never used smokeless tobacco. She reports that she does not drink alcohol and does not use drugs.   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 She was born in [facility], an [place] in the [place].  age 20. Condoms were used for protection for her   who was HIV negative . They were  and he unexpectedly  in 3/21 of an MI. Their son is autistic.  Current Outpatient Medications   Medication Sig Dispense Refill    Biktarvy -25 MG tablet TAKE 1 TABLET BY MOUTH EVERY DAY WITH BREAKFAST 90 tablet 1    Multiple Vitamins-Minerals (MULTIVITAMIN ADULT) TABS Take 1 tablet by mouth daily       No current facility-administered medications for this visit.     Current Outpatient Medications on File Prior to Visit   Medication Sig    Biktarvy -25 MG tablet TAKE 1 TABLET BY MOUTH EVERY DAY WITH BREAKFAST    Multiple Vitamins-Minerals (MULTIVITAMIN ADULT) TABS Take 1 tablet by mouth daily     No current facility-administered medications on file prior to visit.     She has No Known Allergies..    Review of Systems   Constitutional:  Negative for activity change, appetite change, fatigue and unexpected weight change.   Eyes:  Negative for visual disturbance.   Respiratory:  Negative for cough, chest tightness, shortness of breath and wheezing.    Cardiovascular:  Negative for chest pain, palpitations and leg swelling.        Breast: Patient denies tenderness, nipple discharge, masses, or erythema.   Gastrointestinal:  Negative for abdominal distention, abdominal pain, blood in stool, constipation, diarrhea, nausea and vomiting.   Endocrine: Negative for cold intolerance and heat intolerance.   Genitourinary:  Negative for decreased urine volume, difficulty urinating, dysuria, frequency, hematuria, menstrual problem, pelvic pain, urgency, vaginal bleeding, vaginal discharge and vaginal pain.        No incontinence.  Not sexually active.   Musculoskeletal:  Positive for back pain (Related to work). Negative for arthralgias.   Skin:  Negative for rash.   Neurological:  Negative for weakness, light-headedness, numbness and headaches.   Hematological:  Does not bruise/bleed easily.   Psychiatric/Behavioral:  Negative for agitation, behavioral problems,  "dysphoric mood and sleep disturbance. The patient is not nervous/anxious.         Objective:    /64 (BP Location: Right arm, Patient Position: Sitting, Cuff Size: Large)   Ht 5' 2.99\" (1.6 m)   Wt 67.6 kg (149 lb)   LMP  (LMP Unknown)   BMI 26.40 kg/m²          Physical Exam  Constitutional:       Appearance: She is well-developed.   Genitourinary:      Vulva and urethral meatus normal.      Genitourinary Comments: The urethral meatus is normal.  The perineum is within normal limits.  The bladder is well supported.  The uterus and cervix are surgically absent.  The vulva is fairly symmetrical considering she has had a vulvectomy        Right Adnexa: not tender, not full, no mass present and not absent.     Left Adnexa: not tender, not full, no mass present and not absent.     Cervix is absent.      Uterus is absent.   Breasts:     Breasts are symmetrical.      Right: No inverted nipple, mass, nipple discharge, skin change or tenderness.      Left: No inverted nipple, mass, nipple discharge, skin change or tenderness.   HENT:      Head: Normocephalic and atraumatic.   Eyes:      General: No scleral icterus.        Right eye: No discharge.         Left eye: No discharge.      Extraocular Movements: Extraocular movements intact.      Conjunctiva/sclera: Conjunctivae normal.   Neck:      Thyroid: No thyromegaly.      Vascular: No JVD.      Trachea: No tracheal deviation.   Cardiovascular:      Rate and Rhythm: Normal rate and regular rhythm.      Heart sounds: Normal heart sounds. No murmur heard.     No gallop.   Pulmonary:      Effort: Pulmonary effort is normal. No respiratory distress.      Breath sounds: Normal breath sounds. No wheezing or rales.   Chest:      Chest wall: No tenderness.   Abdominal:      General: There is no distension.      Palpations: Abdomen is soft. There is no mass.      Tenderness: There is no abdominal tenderness. There is no guarding or rebound.      Hernia: No hernia is present. "   Musculoskeletal:         General: No tenderness or deformity. Normal range of motion.      Cervical back: Normal range of motion and neck supple.   Lymphadenopathy:      Cervical: No cervical adenopathy.   Neurological:      Mental Status: She is alert and oriented to person, place, and time.   Skin:     General: Skin is warm and dry.   Psychiatric:         Mood and Affect: Mood normal.         Behavior: Behavior normal.         Thought Content: Thought content normal.         Judgment: Judgment normal.   Vitals and nursing note reviewed. Exam conducted with a chaperone present.

## 2024-01-08 ENCOUNTER — ANNUAL EXAM (OUTPATIENT)
Dept: OBGYN CLINIC | Facility: CLINIC | Age: 49
End: 2024-01-08
Payer: COMMERCIAL

## 2024-01-08 VITALS
BODY MASS INDEX: 26.4 KG/M2 | WEIGHT: 149 LBS | HEIGHT: 63 IN | DIASTOLIC BLOOD PRESSURE: 64 MMHG | SYSTOLIC BLOOD PRESSURE: 100 MMHG

## 2024-01-08 DIAGNOSIS — B20 HISTORY OF HIV INFECTION (HCC): ICD-10-CM

## 2024-01-08 DIAGNOSIS — K62.82 ANAL DYSPLASIA: ICD-10-CM

## 2024-01-08 DIAGNOSIS — Z90.710 HISTORY OF ABDOMINAL HYSTERECTOMY: ICD-10-CM

## 2024-01-08 DIAGNOSIS — Z12.31 ENCOUNTER FOR SCREENING MAMMOGRAM FOR BREAST CANCER: ICD-10-CM

## 2024-01-08 DIAGNOSIS — Z01.419 ENCOUNTER FOR ANNUAL ROUTINE GYNECOLOGICAL EXAMINATION: Primary | ICD-10-CM

## 2024-01-08 DIAGNOSIS — C51.9 PRIMARY VULVAR SQUAMOUS CELL CARCINOMA (HCC): ICD-10-CM

## 2024-01-08 DIAGNOSIS — R92.333 HETEROGENEOUSLY DENSE TISSUE OF BOTH BREASTS ON MAMMOGRAPHY: ICD-10-CM

## 2024-01-08 PROCEDURE — 99396 PREV VISIT EST AGE 40-64: CPT | Performed by: OBSTETRICS & GYNECOLOGY

## 2024-01-12 LAB — EXTERNAL HIV SCREEN: NORMAL

## 2024-01-18 ENCOUNTER — OFFICE VISIT (OUTPATIENT)
Dept: INFECTIOUS DISEASES | Facility: CLINIC | Age: 49
End: 2024-01-18
Payer: COMMERCIAL

## 2024-01-18 VITALS
DIASTOLIC BLOOD PRESSURE: 32 MMHG | RESPIRATION RATE: 16 BRPM | SYSTOLIC BLOOD PRESSURE: 112 MMHG | BODY MASS INDEX: 25.78 KG/M2 | OXYGEN SATURATION: 98 % | HEART RATE: 64 BPM | HEIGHT: 64 IN | WEIGHT: 151 LBS | TEMPERATURE: 96.8 F

## 2024-01-18 DIAGNOSIS — C51.9 VULVAR CARCINOMA (HCC): ICD-10-CM

## 2024-01-18 DIAGNOSIS — D72.819 LEUKOPENIA, UNSPECIFIED TYPE: ICD-10-CM

## 2024-01-18 DIAGNOSIS — B20 HIV INFECTION, UNSPECIFIED SYMPTOM STATUS (HCC): Primary | ICD-10-CM

## 2024-01-18 PROCEDURE — 99213 OFFICE O/P EST LOW 20 MIN: CPT | Performed by: INTERNAL MEDICINE

## 2024-01-18 NOTE — PROGRESS NOTES
Progress Note - Infectious Disease   Valorie Coto 48 y.o. female MRN: [id number]  Unit/Bed#:  Encounter: [id number]      Impression/Recommendations:  1.  HIV disease.  This had been well controlled with Atripla.  ART was changed to Biktarvy in early 2019 due to side effect from Atripla.  Patient is tolerating it well.  Her HIV disease remains well controlled with CD4 > 1000 and VL remains undetectable at blood draw earlier this month.  Continue  Biktarvy.  Follow-up in 6 months with labs.     2.  Leukopenia/neutropenia.  WBC is stable, remaining on low end of normal.  Monitor WBC/ANC.    No change in medication for now.     3. Noncompliance with prior ART, mostly secondary to side effect, and follow-up previously.   Patient is now complying with Biktarvy again.  Her CD4/VL is showing compliance.     4.   Stage I SCC of vulva.  Patient is status post wide excision.  She is in remission.  GYN follow-up.      Discussed with patient in detail regarding the above plan.  Follow-up in 6 months with labs.     Antibiotics:  Biktarvy     Subjective:  Patient feels well.  She is tolerating Biktarvy well.  No nausea, vomiting or diarrhea.  She is compliant with her medication.    The following portions of the patient's history were reviewed and updated as appropriate: allergies, current medications, past medical history, past social history, past surgical history and problem list    ROS:  A complete review of systems was done.  Except for what is noted above, the rest of the review of systems was negative.       Objective:  Vitals:  Temperature: (!) 96.8 °F (36 °C)    Physical Exam:     General: Awake, alert, cooperative, no distress.   Neck:  Supple. No lymphadenopathy.  No mass.   Lungs: Expansion symmetric, no rales, no wheezing, respirations unlabored.   Heart:  Regular rate and rhythm, S1 and S2 normal, no murmur.   Abdomen: Soft, nondistended, non-tender, bowel sounds active all four quadrants,        no masses, no  "organomegaly.   Extremities: No edema. No erythema/warmth. No ulcer. Nontender to palpation.   Skin:  No rash.   Neuro: Moves all extremities.     Invasive Devices       None                   Labs studies:   I have personally reviewed pertinent labs.        Invalid input(s): \"ALBUMIN\"            Imaging Studies:   I have personally reviewed pertinent imaging study reports and images in PACS.    EKG, Pathology, and Other Studies:   I have personally reviewed pertinent reports.                          "

## 2024-01-25 ENCOUNTER — HOSPITAL ENCOUNTER (EMERGENCY)
Facility: HOSPITAL | Age: 49
Discharge: HOME | End: 2024-01-25
Attending: EMERGENCY MEDICINE
Payer: COMMERCIAL

## 2024-01-25 ENCOUNTER — APPOINTMENT (EMERGENCY)
Dept: RADIOLOGY | Facility: HOSPITAL | Age: 49
End: 2024-01-25
Payer: COMMERCIAL

## 2024-01-25 VITALS
TEMPERATURE: 97.1 F | SYSTOLIC BLOOD PRESSURE: 124 MMHG | DIASTOLIC BLOOD PRESSURE: 66 MMHG | RESPIRATION RATE: 18 BRPM | HEART RATE: 55 BPM | OXYGEN SATURATION: 100 %

## 2024-01-25 DIAGNOSIS — R55 SYNCOPE: Primary | ICD-10-CM

## 2024-01-25 LAB
ALBUMIN SERPL BCP-MCNC: 4.3 G/DL (ref 3.5–5)
ALP SERPL-CCNC: 60 U/L (ref 34–104)
ALT SERPL W P-5'-P-CCNC: 26 U/L (ref 7–52)
ANION GAP SERPL CALCULATED.3IONS-SCNC: 10 MMOL/L
AST SERPL W P-5'-P-CCNC: 28 U/L (ref 13–39)
ATRIAL RATE: 55 BPM
BASOPHILS # BLD AUTO: 0.02 THOUSANDS/ÂΜL (ref 0–0.1)
BASOPHILS NFR BLD AUTO: 0 % (ref 0–1)
BILIRUB SERPL-MCNC: 0.42 MG/DL (ref 0.2–1)
BUN SERPL-MCNC: 11 MG/DL (ref 5–25)
CALCIUM SERPL-MCNC: 9.3 MG/DL (ref 8.4–10.2)
CARDIAC TROPONIN I PNL SERPL HS: 5 NG/L
CHLORIDE SERPL-SCNC: 107 MMOL/L (ref 96–108)
CO2 SERPL-SCNC: 27 MMOL/L (ref 21–32)
CREAT SERPL-MCNC: 0.7 MG/DL (ref 0.6–1.3)
EOSINOPHIL # BLD AUTO: 0.07 THOUSAND/ÂΜL (ref 0–0.61)
EOSINOPHIL NFR BLD AUTO: 2 % (ref 0–6)
ERYTHROCYTE [DISTWIDTH] IN BLOOD BY AUTOMATED COUNT: 12.6 % (ref 11.6–15.1)
GFR SERPL CREATININE-BSD FRML MDRD: 102 ML/MIN/1.73SQ M
GLUCOSE SERPL-MCNC: 93 MG/DL (ref 65–140)
HCT VFR BLD AUTO: 37.3 % (ref 34.8–46.1)
HGB BLD-MCNC: 11.8 G/DL (ref 11.5–15.4)
IMM GRANULOCYTES # BLD AUTO: 0.01 THOUSAND/UL (ref 0–0.2)
IMM GRANULOCYTES NFR BLD AUTO: 0 % (ref 0–2)
LYMPHOCYTES # BLD AUTO: 2.38 THOUSANDS/ÂΜL (ref 0.6–4.47)
LYMPHOCYTES NFR BLD AUTO: 50 % (ref 14–44)
MCH RBC QN AUTO: 28.9 PG (ref 26.8–34.3)
MCHC RBC AUTO-ENTMCNC: 31.6 G/DL (ref 31.4–37.4)
MCV RBC AUTO: 91 FL (ref 82–98)
MONOCYTES # BLD AUTO: 0.34 THOUSAND/ÂΜL (ref 0.17–1.22)
MONOCYTES NFR BLD AUTO: 7 % (ref 4–12)
NEUTROPHILS # BLD AUTO: 1.92 THOUSANDS/ÂΜL (ref 1.85–7.62)
NEUTS SEG NFR BLD AUTO: 41 % (ref 43–75)
NRBC BLD AUTO-RTO: 0 /100 WBCS
P AXIS: 63 DEGREES
PLATELET # BLD AUTO: 183 THOUSANDS/UL (ref 149–390)
PMV BLD AUTO: 10.4 FL (ref 8.9–12.7)
POTASSIUM SERPL-SCNC: 3.9 MMOL/L (ref 3.5–5.3)
PR INTERVAL: 158 MS
PROT SERPL-MCNC: 7.2 G/DL (ref 6.4–8.4)
QRS AXIS: 71 DEGREES
QRSD INTERVAL: 82 MS
QT INTERVAL: 460 MS
QTC INTERVAL: 440 MS
RBC # BLD AUTO: 4.08 MILLION/UL (ref 3.81–5.12)
SODIUM SERPL-SCNC: 144 MMOL/L (ref 135–147)
T WAVE AXIS: 70 DEGREES
VENTRICULAR RATE: 55 BPM
WBC # BLD AUTO: 4.74 THOUSAND/UL (ref 4.31–10.16)

## 2024-01-25 PROCEDURE — 85025 COMPLETE CBC W/AUTO DIFF WBC: CPT | Performed by: EMERGENCY MEDICINE

## 2024-01-25 PROCEDURE — 99285 EMERGENCY DEPT VISIT HI MDM: CPT | Performed by: EMERGENCY MEDICINE

## 2024-01-25 PROCEDURE — 36415 COLL VENOUS BLD VENIPUNCTURE: CPT

## 2024-01-25 PROCEDURE — 71045 X-RAY EXAM CHEST 1 VIEW: CPT

## 2024-01-25 PROCEDURE — 80053 COMPREHEN METABOLIC PANEL: CPT | Performed by: EMERGENCY MEDICINE

## 2024-01-25 PROCEDURE — 93005 ELECTROCARDIOGRAM TRACING: CPT

## 2024-01-25 PROCEDURE — 93010 ELECTROCARDIOGRAM REPORT: CPT | Performed by: INTERNAL MEDICINE

## 2024-01-25 PROCEDURE — 99284 EMERGENCY DEPT VISIT MOD MDM: CPT

## 2024-01-25 PROCEDURE — 84484 ASSAY OF TROPONIN QUANT: CPT | Performed by: EMERGENCY MEDICINE

## 2024-01-25 NOTE — DISCHARGE INSTRUCTIONS
Follow-up with your primary care doctor.  If you begin to experience symptoms such as dizziness, lightheadedness, weakness, numbness, or generally feel worse, return to the emergency department.  If you have a repeat episode of passing out, return to the emergency department.

## 2024-01-25 NOTE — ED PROVIDER NOTES
History  Chief Complaint   Patient presents with    Syncope     Reports syncopal episode today at 10am, c/o pain to cheekbone, L lip, adolph hands, L knee. -thinners     48-year-old female with past medical history HIV presents to ED for evaluation status post syncopal episode this morning.  Patient states that about 10 AM she was brushing her teeth when she began to feel lightheaded.  Patient reports that she then lost consciousness.  She woke up on the floor.  She is not sure for the amount of time that she lost consciousness for.  This was an unwitnessed event, as she was home by herself.  She did not have any symptoms preceding the event.  Patient reports she thinks she hit her head. When she woke up patient states that she felt the pain on her right cheek, on her lower lip, and on her left knee.  She currently denies dizziness, lightheadedness, chest pain, shortness of breath, nausea, vomiting, trouble walking.  She denies a family and personal history of cardiac disease or seizures.        Prior to Admission Medications   Prescriptions Last Dose Informant Patient Reported? Taking?   Biktarvy -25 MG tablet   No No   Si TAB BY MOUTH ONCE DAILY *ORIGINAL CONTAINER*   Multiple Vitamins-Minerals (MULTIVITAMIN ADULT) TABS  Self Yes No   Sig: Take 1 tablet by mouth daily      Facility-Administered Medications: None       Past Medical History:   Diagnosis Date    Anemia     Asthma     h/o asthma . no current issues, uses no inhalers    Cancer (HCC)     vulva    History of transfusion     HIV (human immunodeficiency virus infection) (HCC)        Past Surgical History:   Procedure Laterality Date    COLONOSCOPY      HYSTERECTOMY  2017    age 42    IN ANOSCOPY DX W/HRA &CHEM AGNTS ENHANCEMENT W/BX N/A 2022    Procedure: ANOSCOPY HIGH RESOLUTION;  Surgeon: Inocencio Alcazar MD;  [facility];  Service: Colorectal    IN TOTAL ABDOMINAL HYSTERECT W/WO RMVL TUBE OVARY N/A 2016    Procedure:  TOTAL ABDOMINAL HYSTERECTOMY  (RICK), REMOVAL OF BOTH TUBES, and biopsy rt. ovarian nodular;  Surgeon: Heri Ramírez MD;  [facility];  Service: Gynecology    MN VULVECTOMY SIMPLE PARTIAL N/A 8/20/2021    Procedure: WIDE LOCAL VULVECTOMY;  Surgeon: Mayela Hyman MD;  [facility];  Service: Gynecology Oncology    RECTAL BIOPSY N/A 8/20/2021    Procedure: EXCISION  BIOPSY LESION/ MASS  ANAL/RECTAL;  Surgeon: Mayela Hyman MD;  [facility];  Service: Gynecology Oncology       Family History   Problem Relation Age of Onset    Diabetes Mother     Diabetes Father     No Known Problems Sister     No Known Problems Brother     No Known Problems Son     No Known Problems Maternal Grandmother     No Known Problems Maternal Grandfather     No Known Problems Paternal Grandmother     No Known Problems Paternal Grandfather     No Known Problems Sister     No Known Problems Brother     No Known Problems Maternal Aunt     No Known Problems Maternal Aunt     No Known Problems Paternal Aunt     No Known Problems Paternal Aunt     No Known Problems Paternal Aunt     Colon cancer Neg Hx      I have reviewed and agree with the history as documented.    E-Cigarette/Vaping    E-Cigarette Use Never User      E-Cigarette/Vaping Substances    Nicotine No     THC No     CBD No     Flavoring No     Other No     Unknown No      Social History     Tobacco Use    Smoking status: Never    Smokeless tobacco: Never   Vaping Use    Vaping status: Never Used   Substance Use Topics    Alcohol use: No    Drug use: No        Review of Systems   Constitutional:  Negative for chills and fever.   Eyes:  Negative for photophobia and visual disturbance.   Respiratory:  Negative for cough and shortness of breath.    Cardiovascular:  Negative for chest pain, palpitations and leg swelling.   Gastrointestinal:  Negative for abdominal pain, nausea and vomiting.   Musculoskeletal:  Positive for arthralgias (L knee). Negative for back pain  and neck pain.   Skin:  Positive for wound (lower lip).   Neurological:  Positive for syncope and light-headedness. Negative for dizziness, weakness, numbness and headaches.   Psychiatric/Behavioral:  Negative for behavioral problems and confusion.        Physical Exam  ED Triage Vitals [01/25/24 1508]   Temperature Pulse Respirations Blood Pressure SpO2   (!) 97.1 °F (36.2 °C) 59 18 124/61 100 %      Temp Source Heart Rate Source Patient Position - Orthostatic VS BP Location FiO2 (%)   Temporal Monitor -- Right arm --      Pain Score       --             Orthostatic Vital Signs  Vitals:    01/25/24 1508 01/25/24 1655   BP: 124/61 124/66   Pulse: 59 55       Physical Exam  Vitals and nursing note reviewed.   Constitutional:       General: She is not in acute distress.     Appearance: Normal appearance. She is normal weight. She is not ill-appearing or toxic-appearing.   HENT:      Head: No raccoon eyes, Acosta's sign, right periorbital erythema or left periorbital erythema.      Comments: 0.5 cm abrasion to the left cheek ecchymosis or swelling.     Right Ear: Hearing, tympanic membrane, ear canal and external ear normal.      Left Ear: Hearing, tympanic membrane, ear canal and external ear normal.      Ears:      Comments: No hemotympanum bilaterally     Mouth/Throat:      Mouth: Mucous membranes are moist.      Comments: 0.5 cm abrasion to the left lower lip which is nonbleeding  Eyes:      Extraocular Movements: Extraocular movements intact.      Conjunctiva/sclera: Conjunctivae normal.      Pupils: Pupils are equal, round, and reactive to light.   Cardiovascular:      Rate and Rhythm: Normal rate and regular rhythm.      Pulses: Normal pulses.      Heart sounds: Normal heart sounds.   Pulmonary:      Effort: Pulmonary effort is normal.      Breath sounds: Normal breath sounds.   Abdominal:      General: There is no distension.      Palpations: Abdomen is soft.      Tenderness: There is no abdominal tenderness.    Musculoskeletal:         General: No swelling or deformity.      Cervical back: Neck supple. No tenderness.      Right lower leg: No edema.      Left lower leg: No edema.      Comments: Left lateral knee with tenderness to palpation and small 1 cm overlying abrasion.  No ecchymosis or swelling.  No pain on range of motion of the left knee.  Mild tenderness palpation of the MCP and PIP joint of the second digit of the left hand with no pain on range of motion, swelling, ecchymosis.   Skin:     General: Skin is warm and dry.      Capillary Refill: Capillary refill takes less than 2 seconds.   Neurological:      Mental Status: She is alert and oriented to person, place, and time.      Cranial Nerves: No cranial nerve deficit.      Comments: 5/5 strength in all extremities   Psychiatric:         Mood and Affect: Mood normal.         Behavior: Behavior normal.         ED Medications  Medications - No data to display    Diagnostic Studies  Results Reviewed       Procedure Component Value Units Date/Time    Comprehensive metabolic panel [[id number]] Collected: 01/25/24 1543    Lab Status: Final result Specimen: Blood from Arm, Right Updated: 01/25/24 1642     Sodium 144 mmol/L      Potassium 3.9 mmol/L      Chloride 107 mmol/L      CO2 27 mmol/L      ANION GAP 10 mmol/L      BUN 11 mg/dL      Creatinine 0.70 mg/dL      Glucose 93 mg/dL      Calcium 9.3 mg/dL      AST 28 U/L      ALT 26 U/L      Alkaline Phosphatase 60 U/L      Total Protein 7.2 g/dL      Albumin 4.3 g/dL      Total Bilirubin 0.42 mg/dL      eGFR 102 ml/min/1.73sq m     Narrative:      National Kidney Disease Foundation guidelines for Chronic Kidney Disease (CKD):     Stage 1 with normal or high GFR (GFR > 90 mL/min/1.73 square meters)    Stage 2 Mild CKD (GFR = 60-89 mL/min/1.73 square meters)    Stage 3A Moderate CKD (GFR = 45-59 mL/min/1.73 square meters)    Stage 3B Moderate CKD (GFR = 30-44 mL/min/1.73 square meters)    Stage 4 Severe CKD (GFR = 15-29  mL/min/1.73 square meters)    Stage 5 End Stage CKD (GFR <15 mL/min/1.73 square meters)  Note: GFR calculation is accurate only with a steady state creatinine    HS Troponin 0hr (reflex protocol) [[id number]]  (Normal) Collected: 01/25/24 1543    Lab Status: Final result Specimen: Blood from Arm, Right Updated: 01/25/24 1625     hs TnI 0hr 5 ng/L     CBC and differential [[id number]]  (Abnormal) Collected: 01/25/24 1543    Lab Status: Final result Specimen: Blood from Arm, Right Updated: 01/25/24 1552     WBC 4.74 Thousand/uL      RBC 4.08 Million/uL      Hemoglobin 11.8 g/dL      Hematocrit 37.3 %      MCV 91 fL      MCH 28.9 pg      MCHC 31.6 g/dL      RDW 12.6 %      MPV 10.4 fL      Platelets 183 Thousands/uL      nRBC 0 /100 WBCs      Neutrophils Relative 41 %      Immat GRANS % 0 %      Lymphocytes Relative 50 %      Monocytes Relative 7 %      Eosinophils Relative 2 %      Basophils Relative 0 %      Neutrophils Absolute 1.92 Thousands/µL      Immature Grans Absolute 0.01 Thousand/uL      Lymphocytes Absolute 2.38 Thousands/µL      Monocytes Absolute 0.34 Thousand/µL      Eosinophils Absolute 0.07 Thousand/µL      Basophils Absolute 0.02 Thousands/µL                    XR chest 1 view portable   Final Result by Lisha Hoffman MD (01/25 1642)      No acute cardiopulmonary disease.               Workstation performed: [id number]               Procedures  ECG 12 Lead Documentation Only    Date/Time: 1/25/2024 4:58 PM    Performed by: Ray Fields DO  Authorized by: Ray Fields DO    Patient location:  ED  Previous ECG:     Previous ECG:  Compared to current    Similarity:  No change  Interpretation:     Interpretation: normal    Rate:     ECG rate:  56    ECG rate assessment: bradycardic    Rhythm:     Rhythm: sinus rhythm    Ectopy:     Ectopy: none    QRS:     QRS axis:  Normal    QRS intervals:  Normal  Conduction:     Conduction: normal    ST segments:     ST segments:  Normal  T waves:     T waves:  normal          ED Course  ED Course as of 01/25/24 2110   Thu Jan 25, 2024   1626 WBC: 4.74  Within normal limits   1626 hs TnI 0hr: 5   1649 Comprehensive metabolic panel  Within normal limits                                       Medical Decision Making  Valorie Coto is a 48 y.o. female who presents to ED for evaluation s/p syncope today. Denies preceding symptoms. Denies family history or personal history of heart disease or seizure.     Physical exam: Vitals stable.  Patient no acute distress.  Heart regular rate and rhythm.  Lungs clear to auscultation bilaterally.  Abdomen soft no tenderness noted.  Small abrasion to the left lower lip.  Mild tenderness to palpation of the left lateral knee with no associated ecchymosis or swelling.  No pain on range of motion of left knee.    Differential diagnoses include: Vasovagal syncope, electrolyte abnormality, arrhythmia.  I doubt intracranial abnormality or intracranial hemorrhage, or seizure.  Patient's last HIV screen undetectable 2 weeks ago.  Patient reports taking her Biktarvy.  I do not suspect any injury to the left knee based on physical exam findings.    Workup includes: EKG, CBC, CMP. Patient does not want any pain medications at this time.     Please see ED Course for additional information.      Amount and/or Complexity of Data Reviewed  Labs: ordered. Decision-making details documented in ED Course.  Radiology: ordered.          Disposition  Final diagnoses:   Syncope     Time reflects when diagnosis was documented in both MDM as applicable and the Disposition within this note       Time User Action Codes Description Comment    1/25/2024  5:29 PM Ray Fields Add [R55] Syncope           ED Disposition       ED Disposition   Discharge    Condition   Stable    Date/Time   Thu Jan 25, 2024  5:20 PM    Comment   Valorie Coto discharge to home/self care.                   Follow-up Information       Follow up With Specialties Details Why Contact Info     Echo Christian MD Internal Medicine   [address] [facility] [place]  [address]  [place] [address]  [phone]              Discharge Medication List as of 1/25/2024  5:31 PM        CONTINUE these medications which have NOT CHANGED    Details   Biktarvy -25 MG tablet 1 TAB BY MOUTH ONCE DAILY *ORIGINAL CONTAINER*, Normal      Multiple Vitamins-Minerals (MULTIVITAMIN ADULT) TABS Take 1 tablet by mouth daily, Historical Med           No discharge procedures on file.    PDMP Review         Value Time User    PDMP Reviewed  Yes 2/4/2022 11:41 AM Inocencio Alcazar MD             ED Provider  Attending physically available and evaluated Valorie Coto. I managed the patient along with the ED Attending.    Electronically Signed by           Ray Fields DO  01/25/24 1922

## 2024-02-07 NOTE — ED ATTENDING ATTESTATION
1/25/2024  I, Atul Molina MD, saw and evaluated the patient. I have discussed the patient with the resident/non-physician practitioner and agree with the resident's/non-physician practitioner's findings, Plan of Care, and MDM as documented in the resident's/non-physician practitioner's note, except where noted. All available labs and Radiology studies were reviewed.  I was present for key portions of any procedure(s) performed by the resident/non-physician practitioner and I was immediately available to provide assistance.       At this point I agree with the current assessment done in the Emergency Department.  I have conducted an independent evaluation of this patient a history and physical is as follows:    ED Course     Patient presents for evaluation after a syncopal episode.  Patient was reportedly brushing her teeth when she felt lightheaded.  Patient states that she woke up on the floor.  Patient does report having some facial pain.  Patient was not incontinent of urine.  No palpitations, chest pain, or shortness of breath.  No additional complaints.  Exam: AAOx3, NAD, RRR, CTA, S/NT/ND, no motor/sensory deficits, abrasion to lower lip, left cheek, and left knee. A/P: Syncope, facial trauma, left knee contusion.  Will check cardiac labs, EKG, and chest x-ray.  Symptoms likely due to vasovagal.    Critical Care Time  Procedures

## 2024-02-21 PROBLEM — Z01.419 ENCOUNTER FOR ANNUAL ROUTINE GYNECOLOGICAL EXAMINATION: Status: RESOLVED | Noted: 2019-03-31 | Resolved: 2024-02-21

## 2024-05-22 DIAGNOSIS — B20 HIV INFECTION, UNSPECIFIED SYMPTOM STATUS (HCC): ICD-10-CM

## 2024-05-22 RX ORDER — BICTEGRAVIR SODIUM, EMTRICITABINE, AND TENOFOVIR ALAFENAMIDE FUMARATE 50; 200; 25 MG/1; MG/1; MG/1
1 TABLET ORAL
Qty: 30 TABLET | Refills: 1 | Status: SHIPPED | OUTPATIENT
Start: 2024-05-22 | End: 2024-05-22 | Stop reason: SDUPTHER

## 2024-05-22 RX ORDER — BICTEGRAVIR SODIUM, EMTRICITABINE, AND TENOFOVIR ALAFENAMIDE FUMARATE 50; 200; 25 MG/1; MG/1; MG/1
TABLET ORAL
Qty: 30 TABLET | Refills: 5 | Status: CANCELLED | OUTPATIENT
Start: 2024-05-22

## 2024-05-22 NOTE — TELEPHONE ENCOUNTER
Reason for call: pt wants medication to go to a different pharmacy. Pharmacy updated for medication   [x] Refill   [] Prior Auth  [] Other:     Office:   [] PCP/Provider -   [x] Specialty/Provider - ID    Medication:           Does the patient have enough for 3 days?   [] Yes   [x] No - Send as HP to POD

## 2024-05-22 NOTE — TELEPHONE ENCOUNTER
Reason for call:   [x] Refill   [] Prior Auth  [] Other:     Office:   [] PCP/Provider -   [x] Specialty/Provider - Le     Medication: Biktarvy -25 MG tablet     Dose/Frequency: 1 TAB BY MOUTH ONCE DAILY *ORIGINAL CONTAINER*,     Quantity: 30    Pharmacy:     Does the patient have enough for 3 days?   [] Yes   [] No - Send as HP to POD

## 2024-05-23 RX ORDER — BICTEGRAVIR SODIUM, EMTRICITABINE, AND TENOFOVIR ALAFENAMIDE FUMARATE 50; 200; 25 MG/1; MG/1; MG/1
1 TABLET ORAL
Qty: 30 TABLET | Refills: 0 | Status: SHIPPED | OUTPATIENT
Start: 2024-05-23

## 2024-06-13 DIAGNOSIS — B20 HIV INFECTION, UNSPECIFIED SYMPTOM STATUS (HCC): ICD-10-CM

## 2024-06-13 RX ORDER — BICTEGRAVIR SODIUM, EMTRICITABINE, AND TENOFOVIR ALAFENAMIDE FUMARATE 50; 200; 25 MG/1; MG/1; MG/1
TABLET ORAL
Qty: 30 TABLET | Refills: 10 | Status: SHIPPED | OUTPATIENT
Start: 2024-06-13

## 2024-08-29 ENCOUNTER — HOSPITAL ENCOUNTER (OUTPATIENT)
Dept: RADIOLOGY | Age: 49
Discharge: HOME | End: 2024-08-29
Payer: COMMERCIAL

## 2024-08-29 DIAGNOSIS — E04.2 NONTOXIC MULTINODULAR GOITER: ICD-10-CM

## 2024-08-29 PROCEDURE — 76536 US EXAM OF HEAD AND NECK: CPT

## 2024-10-21 ENCOUNTER — HOSPITAL ENCOUNTER (OUTPATIENT)
Dept: MAMMOGRAPHY | Facility: CLINIC | Age: 49
Discharge: HOME | End: 2024-10-21
Payer: COMMERCIAL

## 2024-10-21 ENCOUNTER — HOSPITAL ENCOUNTER (OUTPATIENT)
Dept: ULTRASOUND IMAGING | Facility: CLINIC | Age: 49
Discharge: HOME | End: 2024-10-21
Payer: COMMERCIAL

## 2024-10-21 VITALS — WEIGHT: 151 LBS | HEIGHT: 64 IN | BODY MASS INDEX: 25.78 KG/M2

## 2024-10-21 DIAGNOSIS — R92.333 HETEROGENEOUSLY DENSE TISSUE OF BOTH BREASTS ON MAMMOGRAPHY: ICD-10-CM

## 2024-10-21 DIAGNOSIS — Z12.31 ENCOUNTER FOR SCREENING MAMMOGRAM FOR BREAST CANCER: ICD-10-CM

## 2024-10-21 PROCEDURE — 76641 ULTRASOUND BREAST COMPLETE: CPT

## 2024-10-21 PROCEDURE — 77063 BREAST TOMOSYNTHESIS BI: CPT

## 2024-10-21 PROCEDURE — 77067 SCR MAMMO BI INCL CAD: CPT

## 2024-10-24 ENCOUNTER — TELEPHONE (OUTPATIENT)
Dept: OBGYN CLINIC | Facility: MEDICAL CENTER | Age: 49
End: 2024-10-24

## 2025-01-14 ENCOUNTER — ANNUAL EXAM (OUTPATIENT)
Dept: OBGYN CLINIC | Facility: CLINIC | Age: 50
End: 2025-01-14
Payer: COMMERCIAL

## 2025-01-14 VITALS
SYSTOLIC BLOOD PRESSURE: 100 MMHG | HEIGHT: 64 IN | WEIGHT: 150.2 LBS | DIASTOLIC BLOOD PRESSURE: 62 MMHG | BODY MASS INDEX: 25.64 KG/M2

## 2025-01-14 DIAGNOSIS — R92.333 HETEROGENEOUSLY DENSE TISSUE OF BOTH BREASTS ON MAMMOGRAPHY: ICD-10-CM

## 2025-01-14 DIAGNOSIS — Z21 HISTORY OF HIV INFECTION (HCC): ICD-10-CM

## 2025-01-14 DIAGNOSIS — Z01.419 ENCOUNTER FOR ANNUAL ROUTINE GYNECOLOGICAL EXAMINATION: Primary | ICD-10-CM

## 2025-01-14 DIAGNOSIS — Z12.11 COLON CANCER SCREENING: ICD-10-CM

## 2025-01-14 DIAGNOSIS — Z12.31 ENCOUNTER FOR SCREENING MAMMOGRAM FOR BREAST CANCER: ICD-10-CM

## 2025-01-14 DIAGNOSIS — Z80.3 FAMILY HISTORY OF BREAST CANCER: ICD-10-CM

## 2025-01-14 PROCEDURE — S0612 ANNUAL GYNECOLOGICAL EXAMINA: HCPCS | Performed by: STUDENT IN AN ORGANIZED HEALTH CARE EDUCATION/TRAINING PROGRAM

## 2025-01-14 PROCEDURE — G0476 HPV COMBO ASSAY CA SCREEN: HCPCS | Performed by: STUDENT IN AN ORGANIZED HEALTH CARE EDUCATION/TRAINING PROGRAM

## 2025-01-14 PROCEDURE — G0145 SCR C/V CYTO,THINLAYER,RESCR: HCPCS | Performed by: STUDENT IN AN ORGANIZED HEALTH CARE EDUCATION/TRAINING PROGRAM

## 2025-01-14 NOTE — ASSESSMENT & PLAN NOTE
-mother diagnosed with breast cancer, unsure if had genetic testing   -genetic testing consult placed     Orders:    Ambulatory Referral to Oncology Genetics; Future     Pharmacy faxed in a request for prior authorization on:    Medication: ZAFIRLUKAST   Dosage: 20 MG  Quantity requested:  60-TABLETS/5-REFILLS  Pharmacy for prescription has been selected.    Initiation of prior authorization needed.

## 2025-01-14 NOTE — ASSESSMENT & PLAN NOTE
-complaint with meds, follows with ID  -pap collected today     Orders:    Liquid-based pap, screening

## 2025-01-14 NOTE — PROGRESS NOTES
Name: Valorie Coto      : 1975      MRN: [id number]  Encounter Provider: Petty Polk DO  Encounter Date: 2025   Encounter department: [facility]  :  Assessment & Plan  Encounter for annual routine gynecological examination  -pap: collected today, history of LEEP in   -mammogram: 10/21/24 BIRADs 1   -colonoscopy: 3/13/14, repeat in 10 years   -no vaginal bleeding   -not sexually active  -STD testing: denies  -smoking: denies  -drinks alcohol socially  -recommend 30 min of exercise daily   -denies family history of ovarian, colon cancer  -return in one year or earlier if needed     Orders:    Liquid-based pap, screening    Encounter for screening mammogram for breast cancer    Orders:    Mammo screening bilateral w 3d and cad; Future    Heterogeneously dense tissue of both breasts on mammography  -ABUS ordered    Orders:    US breast screening bilateral complete (ABUS); Future    Colon cancer screening    Orders:    Ambulatory referral to Gastroenterology; Future    History of HIV infection (HCC)  -complaint with meds, follows with ID  -pap collected today     Orders:    Liquid-based pap, screening    Family history of breast cancer  -mother diagnosed with breast cancer, unsure if had genetic testing   -genetic testing consult placed     Orders:    Ambulatory Referral to Oncology Genetics; Future        History of Present Illness   HPI  Valorie Coto is a 49 y.o. female  s/p hysterectomy presents for annual exam. Has no acute complaints.       Review of Systems   Constitutional:  Negative for appetite change, chills, fatigue and fever.   Respiratory:  Negative for cough, chest tightness, shortness of breath and wheezing.    Cardiovascular:  Negative for chest pain, palpitations and leg swelling.   Gastrointestinal:  Negative for abdominal distention, abdominal pain, constipation, diarrhea, nausea and vomiting.   Endocrine: Negative for cold  "intolerance, heat intolerance and polyuria.   Genitourinary:  Negative for difficulty urinating, dyspareunia, dysuria, genital sores, menstrual problem, vaginal bleeding, vaginal discharge and vaginal pain.   Neurological:  Negative for dizziness, weakness, light-headedness and headaches.          Objective   /62   Ht 5' 3.78\" (1.62 m)   Wt 68.1 kg (150 lb 3.2 oz)   LMP  (LMP Unknown)   BMI 25.96 kg/m²      Physical Exam  Constitutional:       General: She is not in acute distress.     Appearance: Normal appearance. She is not ill-appearing.   Cardiovascular:      Rate and Rhythm: Normal rate.   Pulmonary:      Effort: Pulmonary effort is normal. No respiratory distress.   Chest:   Breasts:     Abdi Score is 5.      Breasts are symmetrical.      Right: Normal. No swelling, bleeding, inverted nipple, mass, nipple discharge, skin change or tenderness.      Left: Normal. No swelling, bleeding, inverted nipple, mass, nipple discharge, skin change or tenderness.   Abdominal:      General: Abdomen is flat. There is no distension.      Palpations: Abdomen is soft.      Tenderness: There is no abdominal tenderness. There is no guarding or rebound.   Genitourinary:     General: Normal vulva.      Exam position: Lithotomy position.      Labia:         Right: No rash, tenderness, lesion or injury.         Left: No rash, tenderness, lesion or injury.       Vagina: Normal. No foreign body. No vaginal discharge, erythema, tenderness, bleeding or lesions.      Uterus: Absent.    Musculoskeletal:      Right lower leg: No edema.      Left lower leg: No edema.   Lymphadenopathy:      Upper Body:      Right upper body: No supraclavicular, axillary or pectoral adenopathy.      Left upper body: No supraclavicular, axillary or pectoral adenopathy.   Neurological:      General: No focal deficit present.      Mental Status: She is alert and oriented to person, place, and time.   Psychiatric:         Mood and Affect: Mood " normal.         Behavior: Behavior normal.         Thought Content: Thought content normal.         Judgment: Judgment normal.

## 2025-01-14 NOTE — ASSESSMENT & PLAN NOTE
-pap: collected today, history of LEEP in 2014  -mammogram: 10/21/24 BIRADs 1   -colonoscopy: 3/13/14, repeat in 10 years   -no vaginal bleeding   -not sexually active  -STD testing: denies  -smoking: denies  -drinks alcohol socially  -recommend 30 min of exercise daily   -denies family history of ovarian, colon cancer  -return in one year or earlier if needed     Orders:    Liquid-based pap, screening

## 2025-01-15 LAB
HPV HR 12 DNA CVX QL NAA+PROBE: NEGATIVE
HPV16 DNA CVX QL NAA+PROBE: NEGATIVE
HPV18 DNA CVX QL NAA+PROBE: NEGATIVE

## 2025-01-20 ENCOUNTER — RESULTS FOLLOW-UP (OUTPATIENT)
Dept: OBGYN CLINIC | Facility: MEDICAL CENTER | Age: 50
End: 2025-01-20

## 2025-01-20 LAB
LAB AP GYN PRIMARY INTERPRETATION: NORMAL
Lab: NORMAL
PATH INTERP SPEC-IMP: NORMAL

## 2025-02-13 PROBLEM — Z01.419 ENCOUNTER FOR ANNUAL ROUTINE GYNECOLOGICAL EXAMINATION: Status: RESOLVED | Noted: 2025-01-14 | Resolved: 2025-02-13

## 2025-04-01 ENCOUNTER — TELEPHONE (OUTPATIENT)
Dept: GASTROENTEROLOGY | Facility: AMBULARY SURGERY CENTER | Age: 50
End: 2025-04-01

## 2025-04-01 NOTE — TELEPHONE ENCOUNTER
Scheduled date of colonoscopy (as of today):  4/15/25  Physician performing colonoscopy: CHANDAN  Location of colonoscopy: [facility]  Bowel prep reviewed with patient: NICOLE / MERCEDES  Instructions reviewed with patient by: PLEASE MAIL PREP INSTRUCTIONS

## 2025-04-01 NOTE — TELEPHONE ENCOUNTER
04/01/25  Screened by: Charisse Rodriguez    Referring Provider     Pre- Screening:     There is no height or weight on file to calculate BMI.  Has patient been referred for a routine screening Colonoscopy? yes  Is the patient between 45-75 years old? yes      Previous Colonoscopy yes   If yes:    Date: 10 YEARS    Facility:     Reason:     Does the patient want to see a Gastroenterologist prior to their procedure OR are they having any GI symptoms? no    Has the patient been hospitalized or had abdominal surgery in the past 6 months? no    Does the patient use supplemental oxygen? no    Does the patient take Coumadin, Lovenox, Plavix, Elliquis, Xarelto, or other blood thinning medication? no    Has the patient had a stroke, cardiac event, or stent placed in the past year? no         If patient is between 45yrs - 49yrs, please advise patient that we will have to confirm benefits & coverage with their insurance company for a routine screening colonoscopy.

## 2025-04-11 NOTE — TELEPHONE ENCOUNTER
Scheduled date of colonoscopy (as of today):04/30/2025  Physician performing colonoscopy:  Location of colonoscopy[facility]  Bowel prep reviewed with patient:rubi/felipe  Instructions reviewed with patient by:CASTILLO  Clearances: n/a

## 2025-05-20 ENCOUNTER — DOCUMENTATION (OUTPATIENT)
Dept: GENETICS | Facility: CLINIC | Age: 50
End: 2025-05-20

## 2025-05-22 ENCOUNTER — TELEPHONE (OUTPATIENT)
Dept: GENETICS | Facility: CLINIC | Age: 50
End: 2025-05-22

## 2025-05-22 NOTE — TELEPHONE ENCOUNTER
I left a voicemail for Valorie regarding the appointment she had scheduled with a genetic counselor today at 11 am at our office in [place]. It has been 20 minutes since the start of her appointment time when I lvm. I left our phone number if she would like to reschedule her appointment

## 2025-06-05 DIAGNOSIS — Z21 HIV INFECTION, UNSPECIFIED SYMPTOM STATUS (HCC): ICD-10-CM

## 2025-06-05 DIAGNOSIS — Z21 HIV INFECTION, UNSPECIFIED SYMPTOM STATUS (HCC): Primary | ICD-10-CM

## 2025-06-05 RX ORDER — BICTEGRAVIR SODIUM, EMTRICITABINE, AND TENOFOVIR ALAFENAMIDE FUMARATE 50; 200; 25 MG/1; MG/1; MG/1
1 TABLET ORAL
Qty: 30 TABLET | Refills: 1 | Status: SHIPPED | OUTPATIENT
Start: 2025-06-05

## 2025-06-05 NOTE — TELEPHONE ENCOUNTER
I called Valorie to schedule her a follow up appt with Dr. Jo. I offered 6/19 in [place] office but she declined saying she couldn't make it.   She scheduled for 8/6 virtual with Dr. Jo. I let her know as per  to get bloodwork done now or asap, and her prescription will be filled with enough medication to get her to that appt. She understood.

## 2025-06-17 ENCOUNTER — TELEPHONE (OUTPATIENT)
Age: 50
End: 2025-06-17

## 2025-06-17 NOTE — TELEPHONE ENCOUNTER
Received a call from Justina with [facility] regarding patient's Biktarvy PA.    Justina stated the patient is due for the next dose in 2 weeks and the PA  last month.     She stated our office does not need to do the PA, that they do have a dedicated team to complete the PA but they need the most recent clinical notes to be faxed to [phone].     If any questions please call [facility] at [phone].

## 2025-07-31 ENCOUNTER — TELEPHONE (OUTPATIENT)
Dept: INFECTIOUS DISEASES | Facility: CLINIC | Age: 50
End: 2025-07-31

## 2025-08-01 ENCOUNTER — TELEPHONE (OUTPATIENT)
Age: 50
End: 2025-08-01

## 2025-08-06 ENCOUNTER — TELEPHONE (OUTPATIENT)
Dept: INFECTIOUS DISEASES | Facility: CLINIC | Age: 50
End: 2025-08-06

## 2025-08-07 ENCOUNTER — TELEMEDICINE (OUTPATIENT)
Dept: INFECTIOUS DISEASES | Facility: CLINIC | Age: 50
End: 2025-08-07
Payer: COMMERCIAL

## 2025-08-07 DIAGNOSIS — D72.819 LEUKOPENIA, UNSPECIFIED TYPE: ICD-10-CM

## 2025-08-07 DIAGNOSIS — Z21 HIV INFECTION, UNSPECIFIED SYMPTOM STATUS (HCC): Primary | ICD-10-CM

## 2025-08-07 PROCEDURE — 99213 OFFICE O/P EST LOW 20 MIN: CPT | Performed by: INTERNAL MEDICINE

## (undated) DEVICE — TUBING SUCTION 5MM X 12 FT

## (undated) DEVICE — TELFA NON-ADHERENT ABSORBENT DRESSING: Brand: TELFA

## (undated) DEVICE — 3M™ DURAPORE™ SURGICAL TAPE 1538-3, 3 INCH X 10 YARD (7,5CM X 9,1M), 4 ROLLS/BOX: Brand: 3M™ DURAPORE™

## (undated) DEVICE — PLUMEPEN PRO 10FT

## (undated) DEVICE — PAD GROUNDING ADULT

## (undated) DEVICE — GLOVE SRG LF STRL BGL SKNSNS 6.5 PF

## (undated) DEVICE — VESSEL LOOPS X-RAY DETECTABLE: Brand: DEROYAL

## (undated) DEVICE — SPONGE STICK WITH PVP-I: Brand: KENDALL

## (undated) DEVICE — GAUZE SPONGES,16 PLY: Brand: CURITY

## (undated) DEVICE — INTENDED FOR TISSUE SEPARATION, AND OTHER PROCEDURES THAT REQUIRE A SHARP SURGICAL BLADE TO PUNCTURE OR CUT.: Brand: BARD-PARKER SAFETY BLADES SIZE 15, STERILE

## (undated) DEVICE — CAUTERY TIP POLISHER: Brand: DEVON

## (undated) DEVICE — GLOVE INDICATOR PI UNDERGLOVE SZ 8.5 BLUE

## (undated) DEVICE — VIAL DECANTER

## (undated) DEVICE — NEEDLE 25G X 1 1/2

## (undated) DEVICE — PENCIL ELECTROSURG E-Z CLEAN -0035H

## (undated) DEVICE — LUBRICANT SURGILUBE TUBE 4 OZ  FLIP TOP

## (undated) DEVICE — SYRINGE BULB 2 OZ

## (undated) DEVICE — BASIC SINGLE BASIN 2-LF: Brand: MEDLINE INDUSTRIES, INC.

## (undated) DEVICE — ELECTRODE NEEDLE E-Z CLEAN 2.75IN 7CM -0013

## (undated) DEVICE — GLOVE INDICATOR PI UNDERGLOVE SZ 6.5 BLUE

## (undated) DEVICE — GLOVE SRG BIOGEL 8

## (undated) DEVICE — NEEDLE 25GA X 1 IN SAFETY GLIDE

## (undated) DEVICE — DISPOSABLE BRIEF/UNDERWEAR

## (undated) DEVICE — BETHLEHEM UNIVERSAL MINOR GEN: Brand: CARDINAL HEALTH

## (undated) DEVICE — MEDI-VAC YANK SUCT HNDL W/TPRD BULBOUS TIP: Brand: CARDINAL HEALTH

## (undated) DEVICE — PACK PBDS MAJOR GYN VAGINAL SLB

## (undated) DEVICE — ELECTRODE NEEDLE MEGAFINE 2IN E-Z CLEAN MEGADYNE -0118

## (undated) DEVICE — SYRINGE 10ML LL